# Patient Record
Sex: MALE | Race: WHITE | NOT HISPANIC OR LATINO | Employment: OTHER | URBAN - METROPOLITAN AREA
[De-identification: names, ages, dates, MRNs, and addresses within clinical notes are randomized per-mention and may not be internally consistent; named-entity substitution may affect disease eponyms.]

---

## 2018-07-13 ENCOUNTER — TELEPHONE (OUTPATIENT)
Dept: CARDIOLOGY CLINIC | Facility: CLINIC | Age: 73
End: 2018-07-13

## 2018-07-13 NOTE — TELEPHONE ENCOUNTER
Patient called and stated he is having edema in his legs  He is on hydrochlorothiazide  He wants to know if he should increase it? Or come in for an appt?

## 2018-07-18 NOTE — TELEPHONE ENCOUNTER
Called patient twice to make an appt  Wife answered and said she will have him call back if he wants to make an appt

## 2018-12-24 DIAGNOSIS — I10 BENIGN ESSENTIAL HTN: Primary | ICD-10-CM

## 2018-12-24 RX ORDER — AMLODIPINE BESYLATE 5 MG/1
TABLET ORAL
Qty: 90 TABLET | Refills: 5 | Status: SHIPPED | OUTPATIENT
Start: 2018-12-24 | End: 2018-12-28 | Stop reason: SDUPTHER

## 2018-12-28 DIAGNOSIS — I10 BENIGN ESSENTIAL HTN: ICD-10-CM

## 2018-12-29 RX ORDER — AMLODIPINE BESYLATE 5 MG/1
5 TABLET ORAL DAILY
Qty: 90 TABLET | Refills: 3 | Status: SHIPPED | OUTPATIENT
Start: 2018-12-29 | End: 2019-02-14 | Stop reason: SDUPTHER

## 2019-01-02 ENCOUNTER — OFFICE VISIT (OUTPATIENT)
Dept: URGENT CARE | Facility: CLINIC | Age: 74
End: 2019-01-02
Payer: MEDICARE

## 2019-01-02 VITALS
SYSTOLIC BLOOD PRESSURE: 122 MMHG | WEIGHT: 221 LBS | DIASTOLIC BLOOD PRESSURE: 70 MMHG | RESPIRATION RATE: 20 BRPM | OXYGEN SATURATION: 99 % | BODY MASS INDEX: 31.64 KG/M2 | HEIGHT: 70 IN | HEART RATE: 57 BPM | TEMPERATURE: 98.2 F

## 2019-01-02 DIAGNOSIS — L23.7 ALLERGIC CONTACT DERMATITIS DUE TO PLANTS, EXCEPT FOOD: Primary | ICD-10-CM

## 2019-01-02 PROCEDURE — G0463 HOSPITAL OUTPT CLINIC VISIT: HCPCS | Performed by: NURSE PRACTITIONER

## 2019-01-02 PROCEDURE — 99203 OFFICE O/P NEW LOW 30 MIN: CPT | Performed by: NURSE PRACTITIONER

## 2019-01-02 RX ORDER — HYDROCHLOROTHIAZIDE 12.5 MG/1
12.5 CAPSULE, GELATIN COATED ORAL DAILY
COMMUNITY
End: 2019-02-14 | Stop reason: SDUPTHER

## 2019-01-02 RX ORDER — PREDNISONE 10 MG/1
TABLET ORAL
Qty: 30 TABLET | Refills: 0 | Status: SHIPPED | OUTPATIENT
Start: 2019-01-02 | End: 2021-08-14 | Stop reason: ALTCHOICE

## 2019-01-02 NOTE — PATIENT INSTRUCTIONS
Contact Dermatitis   WHAT YOU NEED TO KNOW:   Contact dermatitis is a skin rash  It develops when you touch something that irritates your skin or causes an allergic reaction  DISCHARGE INSTRUCTIONS:   Call 911 for any of the following:   · You have sudden trouble breathing  · Your throat swells and you have trouble eating  · Your face is swollen  Contact your healthcare provider if:   · You have a fever  · Your blisters are draining pus  · Your rash spreads or does not get better, even after treatment  · You have questions or concerns about your condition or care  Medicines:   · Medicines  help decrease itching and swelling  They will be given as a topical medicine to apply to your rash or as a pill  · Take your medicine as directed  Contact your healthcare provider if you think your medicine is not helping or if you have side effects  Tell him or her if you are allergic to any medicine  Keep a list of the medicines, vitamins, and herbs you take  Include the amounts, and when and why you take them  Bring the list or the pill bottles to follow-up visits  Carry your medicine list with you in case of an emergency  Manage contact dermatitis:   · Take short baths or showers in cool water  Use mild soap or soap-free cleansers  Add oatmeal, baking soda, or cornstarch to the bath water to help decrease skin irritation  · Avoid skin irritants , such as makeup, hair products, soaps, and cleansers  Use products that do not contain perfume or dye  · Apply a cool compress to your rash  This will help soothe your skin  · Keep your skin moist   Rub unscented cream or lotion on your skin to prevent dryness and itching  Do this right after a bath or shower when your skin is still damp  Follow up with your healthcare provider or dermatologist in 2 to 3 days:  Write down your questions so you remember to ask them during your visits     © 2017 [employer] Information is for End User's use only and may not be sold, redistributed or otherwise used for commercial purposes  All illustrations and images included in CareNotes® are the copyrighted property of A D A M , Inc  or Vipin Poe  The above information is an  only  It is not intended as medical advice for individual conditions or treatments  Talk to your doctor, nurse or pharmacist before following any medical regimen to see if it is safe and effective for you

## 2019-01-02 NOTE — PROGRESS NOTES
[facility]        NAME: Mikayla Apodaca is a 72 y o  male  : 1945    MRN: [id number]  DATE: 2019  TIME: 3:11 PM    Assessment and Plan   Allergic contact dermatitis due to plants, except food [L23 7]  1  Allergic contact dermatitis due to plants, except food  predniSONE 10 mg tablet         Patient Instructions     Use Claritin during the day and Benadryl at night as directed  Apply cool compresses to affected areas as needed  Follow up with PCP in 3-5 days  Proceed to  ER if symptoms worsen  Chief Complaint     Chief Complaint   Patient presents with    Rash     rash on right wrist,belly and legs for 4-5 days         History of Present Illness       Rash   This is a recurrent problem  Episode onset: Four days  Had previous rash from exposure to poison ivy 3 weeks ago  It resolved with an 8 day a steroid taper  The affected locations include the chest, left arm, left wrist, right wrist and right arm  Past treatments include antihistamine, anti-itch cream and cold compress  The treatment provided mild relief  Review of Systems   Review of Systems   Constitutional: Negative  HENT: Negative  Eyes: Negative  Respiratory: Negative  Cardiovascular: Negative  Gastrointestinal: Negative  Genitourinary: Negative  Musculoskeletal: Negative  Skin: Positive for rash           Current Medications       Current Outpatient Prescriptions:     amLODIPine (NORVASC) 5 mg tablet, Take 1 tablet (5 mg total) by mouth daily, Disp: 90 tablet, Rfl: 3    hydrochlorothiazide (MICROZIDE) 12 5 mg capsule, Take 12 5 mg by mouth daily, Disp: , Rfl:     predniSONE 10 mg tablet, 5 tabs once daily x 2 days then decrease by 1 tab every other day until finished, Disp: 30 tablet, Rfl: 0    Current Allergies     Allergies as of 2019    (No Known Allergies)            The following portions of the patient's history were reviewed and updated as appropriate: allergies, current medications, past family history, past medical history, past social history, past surgical history and problem list      Past Medical History:   Diagnosis Date    Hypertension        No past surgical history on file  No family history on file  Medications have been verified  Objective   /70   Pulse 57   Temp 98 2 °F (36 8 °C) (Tympanic)   Resp 20   Ht 5' 10" (1 778 m)   Wt 100 kg (221 lb)   SpO2 99%   BMI 31 71 kg/m²        Physical Exam     Physical Exam   Constitutional: He is oriented to person, place, and time  He appears well-developed and well-nourished  No distress  HENT:   Head: Normocephalic and atraumatic  Right Ear: External ear normal    Left Ear: External ear normal    Nose: Nose normal    Mouth/Throat: Oropharynx is clear and moist    Eyes: Pupils are equal, round, and reactive to light  Conjunctivae and EOM are normal    Neck: Normal range of motion  Neck supple  Cardiovascular: Normal rate, regular rhythm and normal heart sounds  Pulmonary/Chest: Effort normal and breath sounds normal  No respiratory distress  He has no wheezes  He has no rales  Abdominal: Soft  Bowel sounds are normal    Neurological: He is alert and oriented to person, place, and time  He has normal reflexes  Skin: Skin is warm and dry  Rash noted  Rash is maculopapular (Both upper extremities and wrists  )  He is not diaphoretic  Psychiatric: He has a normal mood and affect  His behavior is normal  Judgment and thought content normal    Nursing note and vitals reviewed

## 2019-02-14 ENCOUNTER — OFFICE VISIT (OUTPATIENT)
Dept: CARDIOLOGY CLINIC | Facility: CLINIC | Age: 74
End: 2019-02-14
Payer: MEDICARE

## 2019-02-14 VITALS
WEIGHT: 224 LBS | HEIGHT: 70 IN | BODY MASS INDEX: 32.07 KG/M2 | HEART RATE: 50 BPM | DIASTOLIC BLOOD PRESSURE: 86 MMHG | SYSTOLIC BLOOD PRESSURE: 142 MMHG

## 2019-02-14 DIAGNOSIS — I10 BENIGN ESSENTIAL HTN: ICD-10-CM

## 2019-02-14 DIAGNOSIS — E03.9 HYPOTHYROIDISM, UNSPECIFIED TYPE: Primary | ICD-10-CM

## 2019-02-14 PROCEDURE — 99213 OFFICE O/P EST LOW 20 MIN: CPT | Performed by: PHYSICIAN ASSISTANT

## 2019-02-14 RX ORDER — AMLODIPINE BESYLATE 5 MG/1
5 TABLET ORAL DAILY
Qty: 90 TABLET | Refills: 3 | Status: SHIPPED | OUTPATIENT
Start: 2019-02-14

## 2019-02-14 RX ORDER — HYDROCHLOROTHIAZIDE 12.5 MG/1
12.5 CAPSULE, GELATIN COATED ORAL DAILY
Qty: 90 CAPSULE | Refills: 3 | Status: SHIPPED | OUTPATIENT
Start: 2019-02-14 | End: 2019-10-01 | Stop reason: CLARIF

## 2019-02-14 RX ORDER — LEVOTHYROXINE SODIUM 88 UG/1
CAPSULE ORAL DAILY
COMMUNITY

## 2019-02-14 NOTE — PROGRESS NOTES
[facility]     Júnior Sagastume / 72 y o  male / : 1945 / MRN: [id number]  Date of Visit: 19    ASSESSMENT/PLAN    Benign essential HTN  · Well controlled on amlodipine and HCTZ    Hypothyroidism  · On levothyroxine      Patient will f/u with us on a PRN basis  SUBJECTIVE:  HPI: Júnior Sagastume is a 72y o  year-old male who presents for follow-up regarding HTN  He is doing very well and his only complaint is some shoulder pain  He was originally referred to us for BP control  He reports that his current regimen of amlodipine and HCTZ has been effective in the last year and BP at home averages 130s/70s, with occasional SBP in the low 140s  He denies chest pain, dyspnea, edema, orthopnea, lightheadedness, near syncope and syncope  He is bradycardic today on exam, but reports he is asymptomatic  He defers EKG today    Other cardiac testing:    Echo 2018 - Normal LV systolic function, mild LVH     No Known Allergies      Current Outpatient Medications:     amLODIPine (NORVASC) 5 mg tablet, Take 1 tablet (5 mg total) by mouth daily, Disp: 90 tablet, Rfl: 3    hydrochlorothiazide (MICROZIDE) 12 5 mg capsule, Take 1 capsule (12 5 mg total) by mouth daily, Disp: 90 capsule, Rfl: 3    Levothyroxine Sodium 88 MCG CAPS, Take by mouth daily, Disp: , Rfl:     predniSONE 10 mg tablet, 5 tabs once daily x 2 days then decrease by 1 tab every other day until finished (Patient not taking: Reported on 2019), Disp: 30 tablet, Rfl: 0    Past Medical History:   Diagnosis Date    History of echocardiogram 2018    Normal EF, Mild LVH    Hypertension     Hypothyroidism        Family History   Problem Relation Age of Onset    Heart disease Mother          at age 59       History reviewed  No pertinent surgical history      Social History     Socioeconomic History    Marital status: /Civil Union     Spouse name: Not on file    Number of children: Not on file    Years of education: Not on file    Highest education level: Not on file   Occupational History    Not on file   Social Needs    Financial resource strain: Not on file    Food insecurity:     Worry: Not on file     Inability: Not on file    Transportation needs:     Medical: Not on file     Non-medical: Not on file   Tobacco Use    Smoking status: Never Smoker    Smokeless tobacco: Never Used   Substance and Sexual Activity    Alcohol use: Not on file    Drug use: Not on file    Sexual activity: Not on file   Lifestyle    Physical activity:     Days per week: Not on file     Minutes per session: Not on file    Stress: Not on file   Relationships    Social connections:     Talks on phone: Not on file     Gets together: Not on file     Attends Christianity service: Not on file     Active member of club or organization: Not on file     Attends meetings of clubs or organizations: Not on file     Relationship status: Not on file    Intimate partner violence:     Fear of current or ex partner: Not on file     Emotionally abused: Not on file     Physically abused: Not on file     Forced sexual activity: Not on file   Other Topics Concern    Not on file   Social History Narrative    Not on file       Review of Systems   Constitution: Negative  HENT: Negative  Eyes: Negative  Cardiovascular: Negative for chest pain, claudication, dyspnea on exertion, irregular heartbeat, leg swelling, near-syncope, orthopnea, palpitations, paroxysmal nocturnal dyspnea and syncope  Respiratory: Negative for cough, shortness of breath and wheezing  Endocrine: Negative  Skin: Negative  Musculoskeletal: Positive for joint pain (R shoulder)  Gastrointestinal: Negative  Genitourinary: Negative  Neurological: Negative for dizziness and light-headedness  Psychiatric/Behavioral: Negative        OBJECTIVE:  Vitals: /86   Pulse (!) 50   Ht 5' 10" (1 778 m)   Wt 102 kg (224 lb)   BMI 32 14 kg/m²     Physical exam:   Physical Exam   Constitutional: He is oriented to person, place, and time  He appears well-developed and well-nourished  No distress  HENT:   Head: Normocephalic and atraumatic  Eyes: EOM are normal  No scleral icterus  Neck: Normal range of motion  Neck supple  No JVD present  Cardiovascular: Regular rhythm, S1 normal, S2 normal and intact distal pulses  Bradycardia present  No murmur heard  Pulmonary/Chest: Effort normal and breath sounds normal  He has no wheezes  He has no rales  Abdominal: Soft  Bowel sounds are normal    Musculoskeletal: He exhibits no edema  Neurological: He is alert and oriented to person, place, and time  Skin: Skin is warm and dry  Psychiatric: He has a normal mood and affect   His behavior is normal      Lab Results:   No results found for: HGBA1C  No results found for: CHOL  No results found for: HDL  No results found for: LDLCALC  No results found for: TRIG  No results found for: CHOLHDL

## 2019-10-01 DIAGNOSIS — I10 BENIGN HYPERTENSION: Primary | ICD-10-CM

## 2019-10-01 RX ORDER — HYDROCHLOROTHIAZIDE 12.5 MG/1
12.5 TABLET ORAL DAILY
Qty: 90 TABLET | Refills: 3 | Status: SHIPPED | OUTPATIENT
Start: 2019-10-01 | End: 2020-12-11

## 2020-02-04 ENCOUNTER — EVALUATION (OUTPATIENT)
Dept: PHYSICAL THERAPY | Facility: CLINIC | Age: 75
End: 2020-02-04
Payer: MEDICARE

## 2020-02-04 DIAGNOSIS — G20 PARKINSON'S DISEASE (HCC): Primary | ICD-10-CM

## 2020-02-04 PROCEDURE — 97110 THERAPEUTIC EXERCISES: CPT | Performed by: PHYSICAL THERAPIST

## 2020-02-04 PROCEDURE — 97163 PT EVAL HIGH COMPLEX 45 MIN: CPT | Performed by: PHYSICAL THERAPIST

## 2020-02-04 PROCEDURE — 97112 NEUROMUSCULAR REEDUCATION: CPT | Performed by: PHYSICAL THERAPIST

## 2020-02-04 NOTE — LETTER
2020    Jm Monson DO  [address] [facility]  [address] [facility] [address]    Patient: Rayna Henson   YOB: 1945   Date of Visit: 2020     Encounter Diagnosis     ICD-10-CM    1  Parkinson's disease [facility]) G20        Dear Dr Dolores Li: Thank you for your recent referral of Rayna Henson  Please review the attached evaluation summary from Al's recent visit  Please verify that you agree with the plan of care by signing the attached order  If you have any questions or concerns, please do not hesitate to call  I sincerely appreciate the opportunity to share in the care of one of your patients and hope to have another opportunity to work with you in the near future  Sincerely,    Elenita Jackson      Referring Provider:      I certify that I have read the below Plan of Care and certify the need for these services furnished under this plan of treatment while under my care  Jm Monson, [address] [address]  VIA Facsimile: [phone]          PT Evaluation     Today's date: 2020  Patient name: Rayna Henson  : 1945  MRN: [id number]  Referring provider: Clark Potts DO  Dx:   Encounter Diagnosis     ICD-10-CM    1  Parkinson's disease ([facility] [address] ) G20                   Assessment  Assessment details: Patient is a 69 y/o male who presents to OPPT with the diagnosis of Parkinson's Disease   Patient demonstrates deficits in ROM, strength, balance, motor planning, divided attention tasks, and functional activities   Patient demonstrates co-morbidities of cataract surgery with artificial lens, h/o GHJ pain/strength, recent history of falls that may impact progress with OPPT interventions   He will benefit from skilled PT interventions at 3 times per week for 4 weeks in order to maximize progress toward personal  goals as established during the first week of therapy interventions      Impairments: abnormal coordination, abnormal gait, abnormal or restricted ROM, activity intolerance, impaired balance, impaired physical strength, lacks appropriate home exercise program, safety issue and poor posture   Understanding of Dx/Px/POC: good   Prognosis: good    Goals  In 2 weeks, patient will:  1        Demonstrate ability to don (UB item of clothing)  in < ** seconds without assistance  2  Demonstrate ability to don (LB item of clothing) in < ** seconds  3  Demonstrate ability to ambulate through doorway/threshold without verbal cues or change in gait quality >80% of occasions  4  Demonstrate ability to reach waist to shoulder/above head without posterior LOB    In 4 weeks, patient will:  5  Demonstrate ability to ambulate forward holding item requiring bilateral UEs without LOB or change in gait quality  6  Ascend stairs step over step with single UE support without change in movement quality  7  Demonstrate ability to roll bilaterally in <5 seconds without use of assistive equipment  8  Demonstrate reduced fall risk based on functional outcome measure      Plan  Patient would benefit from: skilled physical therapy  Other planned modality interventions: Modalities prn for symptom management  Planned therapy interventions: manual therapy, neuromuscular re-education, therapeutic exercise and home exercise program  Frequency: 3x week  Duration in visits: 12  Duration in weeks: 4  Plan of Care beginning date: 2/4/2020  Plan of Care expiration date: 3/5/2020  Treatment plan discussed with: patient        Subjective Evaluation    History of Present Illness  Mechanism of injury: Patient reports he has had symptoms of PD for years  Notes that several years ago he had a cataract surgery requiring an artificial lens to be placed  Since that time he has a 4 inch difference between his eyes  He contributes a lot of his balance deficits to the change in his vision    Denies numbness/tingling/pain on a regular basis  Has had cortisone shots that seem to relieve the orthopedic pain  Primary complaints:  Legs started to get weaker in the last 3-4 months  Fatigues quicker than he would like  Entering/exiting bed  Walking  Negotiation of stairs  Clearing obstacles  Does not climb on ladders at this point  Difficulty with getting up/down from the floor  Things are just taking longer to do  Quality of life: good    Pain  No pain reported  Aggravating factors: stair climbing  Progression: no change    Social Support  Steps to enter house: yes  Stairs in house: yes   Lives in: multiple-level home  Lives with: spouse    Employment status: not working (Retired, but very active)  Hand dominance: right      Diagnostic Tests  Abnormal MRI: IMPRESSION: No intracranial mass or abnormal enhancement  Moderate chronic microvascular  Treatments  Previous treatment: physical therapy ([facility])  Patient Goals  Patient goals for therapy: improved balance, independence with ADLs/IADLs and increased strength          Objective     Concurrent Complaints  Positive for visual change (h/o eye surgery/cataract, artificial lens  )  Negative for headaches, nausea/motion sickness, tinnitus, hearing loss, aural fullness, poor concentration and peripheral neuropathy    Strength/Myotome Testing     Left Hip   Planes of Motion   Flexion: 4-  Extension: 3  Abduction: 3+    Right Hip   Planes of Motion   Flexion: 4  Extension: 3+  Abduction: 4    Left Knee   Flexion: 4-  Extension: 4    Right Knee   Flexion: 4+  Extension: 4+    Left Ankle/Foot   Dorsiflexion: 5  Plantar flexion: 5    Right Ankle/Foot   Dorsiflexion: 5  Plantar flexion: 5  Neuro Exam:     Dizziness  Negative for disequilibrium, oscillopsia and diplopia  Headaches   Patient reports headaches: No      Cervical exam   Ligament Laxity Testing   Alar ligament: WNL  Sharp Karin:  WNL  Modified VBI   Left: asymptomatic  Right: asymptomatic    Oculomotor exam   Oculomotor ROM: diminished  Resting nystagmus: not present   Gaze holding nystagmus: not present left  and not present right  Smooth pursuits: saccadic smooth pursuit    Sensation   Light touch LE: left WNL and right WNL  Proprioception LE: left WNL and right WNL    Coordination   Heel to shin: left WNL and right WNL  Rapid alternating movements: LE impaired     Functional outcomes   Functional outcome comment: Mini Best     Sit to Stand  1=Moderate: Comes to stand WITH use of hands on first attempt     Rise to Toes  2= Normal:  Stable for 3 seconds with maximum height     Stand on one leg  0=Severe:  Unable  Right:  2 seconds  Left:  2 seconds      Compensatory Stepping Correction - Forward  2= Normal:  Recovers independently with a single, large step (2nd realignment step is allowed)  Compensatory Stepping Correction - Backward  2= Normal:  Recovers independently with a single, large step (2nd realignment step is allowed)  Compensatory Stepping Correction - Lateral  2= Normal:  Recovers independently with a single, large step (cross over or lateral OK)  Stance (Feet Together); Eyes open, Firm surface  2= Normal:  30 seconds     Stance (Feet Together);  Eyes closed, Foam surface  2= Normal:  30 seconds    Incline - Eyes Closed  2= Normal:  Stands independently 30 seconds and aligns with gravity    Change in gait speed  1= Moderate:  Unable to change walking speed or signs of imbalance     Walk with Head Turns - Horizontal  1= Moderate:  Performs head turns with reduction in gait speed     Walk with Pivot Turns  1= Moderate:  Turns with feet close SLOW (> or = to 4 steps) with good balance     Step over obstacles  2= Normal:  Able to step over box with minimal change of gait speed and with good balance     Timed up and go with dual task (3 meter walk)  TU 8 Seconds  Dual Task TU 2 Seconds  2= Normal:  No noticeable change in sitting, standing, or walking while backward counting when compared to TUG without dual task    Total Score: 22/28    Functional outcome gait comment: Ambulates with decreased awareness of environmental challenges, cues to avoid obstacles intermittently  Patient attributes this to his visual challenges since surgery  Good foot clearance on level surfaces, increased attention to task required for negotiation of stairs               Precautions: Falls  Re-eval Date: 3/3/2020    Date 2/4/2020       Visit Count 1       FOTO See IE       Pain In See IE       Pain Out See IE               Manual         LE stretching                    Exercise Diary  180/90, HR 73, SpO2 98% with activity       Aerobic SRB 10 mins L2               PWR focus                Gait with turns                Gait with divided attention                Gait with bimanual manipulation                Floor transfers                Obstacle course                            Modalities

## 2020-02-04 NOTE — PROGRESS NOTES
PT Evaluation     Today's date: 2020  Patient name: Tristian Lomas  : 1945  MRN: [id number]  Referring provider: Addis Green DO  Dx:   Encounter Diagnosis     ICD-10-CM    1  Parkinson's disease ([facility] [address] ) G20                   Assessment  Assessment details: Patient is a 67 y/o male who presents to OPPT with the diagnosis of Parkinson's Disease   Patient demonstrates deficits in ROM, strength, balance, motor planning, divided attention tasks, and functional activities   Patient demonstrates co-morbidities of cataract surgery with artificial lens, h/o GHJ pain/strength, recent history of falls that may impact progress with OPPT interventions   He will benefit from skilled PT interventions at 3 times per week for 4 weeks in order to maximize progress toward personal  goals as established during the first week of therapy interventions  Impairments: abnormal coordination, abnormal gait, abnormal or restricted ROM, activity intolerance, impaired balance, impaired physical strength, lacks appropriate home exercise program, safety issue and poor posture   Understanding of Dx/Px/POC: good   Prognosis: good    Goals  In 2 weeks, patient will:  1        Demonstrate ability to don (UB item of clothing)  in < ** seconds without assistance  2  Demonstrate ability to don (LB item of clothing) in < ** seconds  3  Demonstrate ability to ambulate through doorway/threshold without verbal cues or change in gait quality >80% of occasions  4  Demonstrate ability to reach waist to shoulder/above head without posterior LOB    In 4 weeks, patient will:  5  Demonstrate ability to ambulate forward holding item requiring bilateral UEs without LOB or change in gait quality  6  Ascend stairs step over step with single UE support without change in movement quality  7  Demonstrate ability to roll bilaterally in <5 seconds without use of assistive equipment  8        Demonstrate reduced fall risk based on functional outcome measure      Plan  Patient would benefit from: skilled physical therapy  Other planned modality interventions: Modalities prn for symptom management  Planned therapy interventions: manual therapy, neuromuscular re-education, therapeutic exercise and home exercise program  Frequency: 3x week  Duration in visits: 12  Duration in weeks: 4  Plan of Care beginning date: 2/4/2020  Plan of Care expiration date: 3/5/2020  Treatment plan discussed with: patient        Subjective Evaluation    History of Present Illness  Mechanism of injury: Patient reports he has had symptoms of PD for years  Notes that several years ago he had a cataract surgery requiring an artificial lens to be placed  Since that time he has a 4 inch difference between his eyes  He contributes a lot of his balance deficits to the change in his vision  Denies numbness/tingling/pain on a regular basis  Has had cortisone shots that seem to relieve the orthopedic pain  Primary complaints:  Legs started to get weaker in the last 3-4 months  Fatigues quicker than he would like  Entering/exiting bed  Walking  Negotiation of stairs  Clearing obstacles  Does not climb on ladders at this point  Difficulty with getting up/down from the floor  Things are just taking longer to do  Quality of life: good    Pain  No pain reported  Aggravating factors: stair climbing  Progression: no change    Social Support  Steps to enter house: yes  Stairs in house: yes   Lives in: multiple-level home  Lives with: spouse    Employment status: not working (Retired, but very active)  Hand dominance: right      Diagnostic Tests  Abnormal MRI: IMPRESSION: No intracranial mass or abnormal enhancement  Moderate chronic microvascular    Treatments  Previous treatment: physical therapy ([facility])  Patient Goals  Patient goals for therapy: improved balance, independence with ADLs/IADLs and increased strength          Objective     Concurrent Complaints  Positive for visual change (h/o eye surgery/cataract, artificial lens  )  Negative for headaches, nausea/motion sickness, tinnitus, hearing loss, aural fullness, poor concentration and peripheral neuropathy    Strength/Myotome Testing     Left Hip   Planes of Motion   Flexion: 4-  Extension: 3  Abduction: 3+    Right Hip   Planes of Motion   Flexion: 4  Extension: 3+  Abduction: 4    Left Knee   Flexion: 4-  Extension: 4    Right Knee   Flexion: 4+  Extension: 4+    Left Ankle/Foot   Dorsiflexion: 5  Plantar flexion: 5    Right Ankle/Foot   Dorsiflexion: 5  Plantar flexion: 5  Neuro Exam:     Dizziness  Negative for disequilibrium, oscillopsia and diplopia  Headaches   Patient reports headaches: No      Cervical exam   Ligament Laxity Testing   Alar ligament: WNL  Sharp Karin: WNL  Modified VBI   Left: asymptomatic  Right: asymptomatic    Oculomotor exam   Oculomotor ROM: diminished  Resting nystagmus: not present   Gaze holding nystagmus: not present left  and not present right  Smooth pursuits: saccadic smooth pursuit    Sensation   Light touch LE: left WNL and right WNL  Proprioception LE: left WNL and right WNL    Coordination   Heel to shin: left WNL and right WNL  Rapid alternating movements: LE impaired     Functional outcomes   Functional outcome comment: Mini Best     Sit to Stand  1=Moderate: Comes to stand WITH use of hands on first attempt     Rise to Toes  2= Normal:  Stable for 3 seconds with maximum height     Stand on one leg  0=Severe:  Unable  Right:  2 seconds  Left:  2 seconds      Compensatory Stepping Correction - Forward  2= Normal:  Recovers independently with a single, large step (2nd realignment step is allowed)  Compensatory Stepping Correction - Backward  2= Normal:  Recovers independently with a single, large step (2nd realignment step is allowed)       Compensatory Stepping Correction - Lateral  2= Normal:  Recovers independently with a single, large step (cross over or lateral OK)     Stance (Feet Together); Eyes open, Firm surface  2= Normal:  30 seconds     Stance (Feet Together); Eyes closed, Foam surface  2= Normal:  30 seconds    Incline - Eyes Closed  2= Normal:  Stands independently 30 seconds and aligns with gravity    Change in gait speed  1= Moderate:  Unable to change walking speed or signs of imbalance     Walk with Head Turns - Horizontal  1= Moderate:  Performs head turns with reduction in gait speed     Walk with Pivot Turns  1= Moderate:  Turns with feet close SLOW (> or = to 4 steps) with good balance     Step over obstacles  2= Normal:  Able to step over box with minimal change of gait speed and with good balance     Timed up and go with dual task (3 meter walk)  TU 8 Seconds  Dual Task TU 2 Seconds  2= Normal:  No noticeable change in sitting, standing, or walking while backward counting when compared to TUG without dual task    Total Score: 22/28    Functional outcome gait comment: Ambulates with decreased awareness of environmental challenges, cues to avoid obstacles intermittently  Patient attributes this to his visual challenges since surgery  Good foot clearance on level surfaces, increased attention to task required for negotiation of stairs               Precautions: Falls  Re-eval Date: 3/3/2020    Date 2020       Visit Count 1       FOTO See IE       Pain In See IE       Pain Out See IE               Manual         LE stretching                    Exercise Diary  180/90, HR 73, SpO2 98% with activity       Aerobic SRB 10 mins L2               PWR focus                Gait with turns                Gait with divided attention                Gait with bimanual manipulation                Floor transfers                Obstacle course                            Modalities

## 2020-02-07 ENCOUNTER — TRANSCRIBE ORDERS (OUTPATIENT)
Dept: PHYSICAL THERAPY | Facility: CLINIC | Age: 75
End: 2020-02-07

## 2020-02-07 DIAGNOSIS — G20 PARKINSON DISEASE (HCC): Primary | ICD-10-CM

## 2020-02-10 ENCOUNTER — OFFICE VISIT (OUTPATIENT)
Dept: PHYSICAL THERAPY | Facility: CLINIC | Age: 75
End: 2020-02-10
Payer: MEDICARE

## 2020-02-10 DIAGNOSIS — G20 PARKINSON'S DISEASE (HCC): Primary | ICD-10-CM

## 2020-02-10 PROCEDURE — 97110 THERAPEUTIC EXERCISES: CPT

## 2020-02-10 PROCEDURE — 97112 NEUROMUSCULAR REEDUCATION: CPT

## 2020-02-10 NOTE — PROGRESS NOTES
Daily Note     Today's date: 2/10/2020  Patient name: Sharath Toth  : 1945  MRN: [id number]  Referring provider: Noé Fleming DO  Dx:   Encounter Diagnosis     ICD-10-CM    1  Parkinson's disease ([facility] [address] ) G20                   Subjective:  I see neurologist I think the  this month  I fell 2x over the weekend and once this morning      Objective: See treatment diary below      Assessment: Tolerated treatment well  Denied any increase pain/sx's with initial PWR exercises  Patient provided verbal/tactile cues prn for proper form and technique with exercises completed this date  Pt/spouse educated / issued HEP with encouragement compliancy  Review upcoming / [place]    NO LOB this date, provided CS with ambulation 2* FALL RISK   Patient would benefit from continued PT improve BL LE strength/stability/mobility, gait quality/safety and endurance to max overall QOL          Plan: Continue per plan of care        Precautions: Fall RISK  Re-eval Date: 3/3/2020    Date 2020 2/10      Visit Count 1 2      FOTO See IE/completed       Pain In See IE       Pain Out See IE         Manual         LE stretching                  Exercise Diary  180/90, HR 73, SpO2 98% with activity /84 before NS  134/82 end rx session  SPO2 98%  HR 72      Aerobic SRB 10 mins L2 Nustep  L3 10 min  Monitor vitals  Cues SPM   L4             PWR focus  Sit/Stand  40 min  Cues   - Up  - Rock  - Twist  - Step Floor               Gait with turns  10 min  -Turns  - Fast/slow/  stop  - Head turns  CS/CGA              Gait with divided attention   *             Gait with bimanual manipulation                Floor transfers                Obstacle course                            Modalities

## 2020-02-12 ENCOUNTER — OFFICE VISIT (OUTPATIENT)
Dept: PHYSICAL THERAPY | Facility: CLINIC | Age: 75
End: 2020-02-12
Payer: MEDICARE

## 2020-02-12 DIAGNOSIS — G20 PARKINSON'S DISEASE (HCC): Primary | ICD-10-CM

## 2020-02-12 PROCEDURE — 97110 THERAPEUTIC EXERCISES: CPT

## 2020-02-12 PROCEDURE — 97112 NEUROMUSCULAR REEDUCATION: CPT | Performed by: PHYSICAL THERAPIST

## 2020-02-12 NOTE — PROGRESS NOTES
Daily Note     Today's date: 2020  Patient name: Niharika Lindo  : 1945  MRN: [id number]  Referring provider: Tony Marrero DO  Dx:   Encounter Diagnosis     ICD-10-CM    1  Parkinson's disease ([facility] [address] ) G20                   Subjective: Patient reports he couldn't do his exercises because the lady on the video kept talking while he had his arms up  Objective: See treatment diary below      Assessment: Tolerated treatment fair  Patient demonstrated fatigue post treatment and would benefit from continued PT  Continued to monitor vitals with no issues noted this date  Ongoing struggles with turns and divided attention, decreased cervical spine movement with turns and environmental scanning  Plan: Continue per plan of care        Precautions: Fall RISK  Re-eval Date: 3/3/2020    Date 2020 2/10 2/12     Visit Count 1 2 3     FOTO See IE/completed       Pain In See IE       Pain Out See IE         Manual         LE stretching                  Exercise Diary  180/90, HR 73, SpO2 98% with activity /84 before NS  134/82 end rx session  SPO2 98%  HR 72 Co-Tx w/PTA/CV 15 min     Aerobic SRB 10 mins L2 Nustep  L3 10 min  Monitor vitals  Cues SPM Nustep  L4 15 min  Monitor vitals  Cues pacing  80-90 SPM             PWR focus  Sit/Stand  40 min  Cues   - Up  - Rock  - Twist  - Step Floor  40 min  Cues and demo 1:1 THIS DATE  - Up  - Rock  - Twist  - Step             Gait with turns  10 min  -Turns  - Fast/slow/  stop  - Head turns  CS/CGA 10 min  -Turns  - Fast/slow/  stop  - Head turns  CS/CGA             Gait with divided attention    **    Gait with head turns    **    Gait with bimanual manipulation                Floor transfers   Supervision to independent without AD/AE and supervision with LE fatigue             Obstacle course                            Modalities

## 2020-02-13 ENCOUNTER — OFFICE VISIT (OUTPATIENT)
Dept: PHYSICAL THERAPY | Facility: CLINIC | Age: 75
End: 2020-02-13
Payer: MEDICARE

## 2020-02-13 DIAGNOSIS — G20 PARKINSON'S DISEASE (HCC): Primary | ICD-10-CM

## 2020-02-13 PROCEDURE — 97112 NEUROMUSCULAR REEDUCATION: CPT

## 2020-02-13 PROCEDURE — 97110 THERAPEUTIC EXERCISES: CPT

## 2020-02-13 PROCEDURE — 97116 GAIT TRAINING THERAPY: CPT

## 2020-02-13 NOTE — PROGRESS NOTES
Daily Note     Today's date: 2020  Patient name: Monie Hairston  : 1945  MRN: [id number]  Referring provider: Hattie Schaumann, DO  Dx:   Encounter Diagnosis     ICD-10-CM    1  Parkinson's disease ([facility] [address] ) G20                   Subjective: I went dancing last night  Some LOB but didn't fall  Have trouble with the pivots with country line dancing      Objective: See treatment diary below      Assessment: Tolerated treatment well  Denied any increase pain/sx's  Pt with 2 LOB during neuro activity 1x self correction / 1x CGA for balance recovery  Pt demonstrates occasional impulsiveness 1* with ambulation/divided attention  Pt demonstrated improvement with turns with functional activities  Patient would benefit from continued PT      Plan: Continue per plan of care        Precautions: Fall RISK  Re-eval Date: 3/3/2020    Date 2020 2/10 2/12 2/13    Visit Count 1 2 3 4    FOTO See IE/completed       Pain In See IE       Pain Out See IE         Manual         LE stretching          Exercise Diary    Co-Tx w/PTA/CV 15 min     VITALS 180/90, HR 73, SpO2 98% with activity /84 before NS  134/82 end rx session  SPO2 98%  HR 72  /82    Aerobic SRB 10 mins L2 Nustep  L3 10 min  Monitor vitals  Cues SPM Nustep  L4 15 min  Monitor vitals  Cues pacing  80-90 SPM Nustep  L5 15 min  Monitor vitals  Cues pacing  80-90 SPM            PWR focus  Sit/Stand  40 min  Cues   - Up  - Rock  - Twist  - Step Floor  40 min  Cues and demo 1:1 THIS DATE  - Up  - Rock  - Twist  - Step Stand  15 min  Cues   - Up  - Rock  - Twist  - Step            Gait with turns  10 min  -Turns  - Fast/slow/  stop  - Head turns  CS/CGA 10 min  -Turns  - Fast/slow/  stop  - Head turns  CS/CGA 15 min w/ functional activities/laundry, transition from firm/foam surface            Gait with divided attention    Gait fwd, side step, high marches w/ divided attention, head turns  15 min  LOB CGA x1    Gait with head turns    **    Gait with bimanual manipulation                Floor transfers   Supervision to independent without AD/AE and supervision with LE fatigue             Obstacle course                            Modalities

## 2020-02-17 ENCOUNTER — OFFICE VISIT (OUTPATIENT)
Dept: PHYSICAL THERAPY | Facility: CLINIC | Age: 75
End: 2020-02-17
Payer: MEDICARE

## 2020-02-17 DIAGNOSIS — G20 PARKINSON'S DISEASE (HCC): Primary | ICD-10-CM

## 2020-02-17 PROCEDURE — 97112 NEUROMUSCULAR REEDUCATION: CPT

## 2020-02-17 PROCEDURE — 97110 THERAPEUTIC EXERCISES: CPT

## 2020-02-17 NOTE — PROGRESS NOTES
Daily Note     Today's date: 2020  Patient name: Kevin Williamson  : 1945  MRN: [id number]  Referring provider: Yadiel Hunt DO  Dx:   Encounter Diagnosis     ICD-10-CM    1  Parkinson's disease ([facility] [address] ) G20                   Subjective: I tried doing some of my exercises    Objective: See treatment diary below      Assessment: Tolerated treatment well  Demonstrated 2 LOB with self correct to CGA for balance recovery  Pt demonstrated difficulty with memory recall with retro ambulation  Pt challenge alt tandem step on/off foam activity  Patient provided verbal/tactile cues prn for proper form and technique with exercises completed this date  Patient would benefit from continued PT      Plan: Continue per plan of care        Precautions: Fall RISK  Re-eval Date: 3/3/2020    Date 2020 2/10 2/12 2/13 2/17   Visit Count 1 2 3 4 5   FOTO See IE/completed       Pain In See IE       Pain Out See IE         Manual         LE stretching          Exercise Diary    Co-Tx w/PTA/CV 15 min     VITALS 180/90, HR 73, SpO2 98% with activity /84 before NS  134/82 end rx session  SPO2 98%  HR 72  /82 /80   Aerobic SRB 10 mins L2 Nustep  L3 10 min  Monitor vitals  Cues SPM Nustep  L4 15 min  Monitor vitals  Cues pacing  80-90 SPM Nustep  L5 15 min  Monitor vitals  Cues pacing  80-90 SPM Nustep  L5 15 min  Monitor vitals  Cues pacing  80-90 SPM           PWR focus  Sit/Stand  40 min  Cues   - Up  - Rock  - Twist  - Step Floor  40 min  Cues and demo 1:1 THIS DATE  - Up  - Rock  - Twist  - Step Stand  15 min  Cues   - Up  - Rock  - Twist  - Step Stand/ foam  15 min  Cues   - Up  - Rock  - Twist  - Step    All 4's  - Twist only           Gait with turns  10 min  -Turns  - Fast/slow/  stop  - Head turns  CS/CGA 10 min  -Turns  - Fast/slow/  stop  - Head turns  CS/CGA 15 min w/ functional activities/laundry, transition from firm/foam surface  resume           Gait with divided attention    Gait fwd, side step, high marches w/ divided attention, head turns  15 min  LOB CGA x1 Gait fwd, side step, high marches w/ divided attention, head turns  15 min  LOB CGA x1   Gait with head turns    ** Hallway  5 min   Gait with bimanual manipulation                Tandem stance w/ ball toss     W/ divided attention  Alt tandem stance every min as angelita  5 min   Floor transfers   Supervision to independent without AD/AE and supervision with LE fatigue             Obstacle course                            Modalities

## 2020-02-20 ENCOUNTER — TRANSCRIBE ORDERS (OUTPATIENT)
Dept: ADMINISTRATIVE | Facility: HOSPITAL | Age: 75
End: 2020-02-20

## 2020-02-20 ENCOUNTER — OFFICE VISIT (OUTPATIENT)
Dept: PHYSICAL THERAPY | Facility: CLINIC | Age: 75
End: 2020-02-20
Payer: MEDICARE

## 2020-02-20 DIAGNOSIS — G23.1 PSP (PROGRESSIVE SUPRANUCLEAR PALSY) (HCC): Primary | ICD-10-CM

## 2020-02-20 DIAGNOSIS — G72.9 MYOPATHY: Primary | ICD-10-CM

## 2020-02-20 DIAGNOSIS — G20 PARKINSON'S DISEASE (HCC): Primary | ICD-10-CM

## 2020-02-20 PROCEDURE — 97112 NEUROMUSCULAR REEDUCATION: CPT

## 2020-02-20 PROCEDURE — 97110 THERAPEUTIC EXERCISES: CPT

## 2020-02-20 NOTE — PROGRESS NOTES
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  It is affecting his left eye where he has trouble keeping it open  MD is scheduling him for an EMG upcoming      Objective: See treatment diary below      Assessment: Tolerated treatment well  Denied any increase pain / sx's  Pt demonstrates delay with L LE with metronome stepping in place  Pt challenge with multi direction stepping  2 LOB today with standing neuro activities w/ CS and pt self correct  Pt /spouse educated stepping strategies in place/multi direction to metronome pacing   Patient would benefit from continued PT      Plan: Continue per plan of care        Precautions: Fall RISK  Re-eval Date: 3/3/2020    Date 2/20 2/10 2/12 2/13 2/17   Visit Count 6 2 3 4 5   FOTO        Pain In 0       Pain Out 0         Manual         LE stretching          Exercise Diary         VITALS /78       Aerobic Nustep  L5 15 min  Monitor vitals  Cues pacing  80-90 SPM               PWR focus Stand firm/foam  15 min  Cues   - Up  - Rock  - Twist  - Step    All 4's  - Up  - Twist w/ divided attention               Gait with turns  resume               Gait with divided attention Gait fwd, side step, high marches w/ divided attention, head turns    resume       Gait with head turns resume       Gait with bimanual manipulation                Tandem stance w/ ball toss W/ divided attention  Alt tandem stance every min as angelita  5 min       Floor transfers                Obstacle course        Fwd Step Dynamic reach  2x 10       Step back and bow Dynamic reach  2x10       Multi directional stepping 5 trials marching in place to 60 BPM,    4 trials ea multi direction - firm  Pt w/ self verbal commands and non verbal commands    4x to 60 BPM  2x to music           Modalities

## 2020-02-21 ENCOUNTER — OFFICE VISIT (OUTPATIENT)
Dept: PHYSICAL THERAPY | Facility: CLINIC | Age: 75
End: 2020-02-21
Payer: MEDICARE

## 2020-02-21 DIAGNOSIS — G20 PARKINSON'S DISEASE (HCC): Primary | ICD-10-CM

## 2020-02-21 PROCEDURE — 97110 THERAPEUTIC EXERCISES: CPT

## 2020-02-21 PROCEDURE — 97112 NEUROMUSCULAR REEDUCATION: CPT

## 2020-02-21 NOTE — PROGRESS NOTES
Daily Note     Today's date: 2020  Patient name: Carlo Hightower  : 1945  MRN: [id number]  Referring provider: Sol Lyman DO  Dx:   Encounter Diagnosis     ICD-10-CM    1  Parkinson's disease ([facility] [address] ) G20                   Subjective: I am doing good today        Objective: See treatment diary below      Assessment: Tolerated treatment well  Denied any increase pain/sx's  Pt demonstrates difficulty with reciprocol UE/LE pattern with gait  Added poles for manual cues  Pt struggled with multidirectional stepping 1* LLE advancement  Patient would benefit from continued PT  Review PWR upcoming      Plan: Continue per plan of care        Precautions: Fall RISK  Re-eval Date: 3/3/2020    Date       Visit Count 6 7      FOTO  completed      Pain In 0 0      Pain Out 0 0        Manual         LE stretching          Exercise Diary         VITALS /78 /78      Aerobic Nustep  L5 15 min  Monitor vitals  Cues pacing  80-90 SPM Upright cycle  L5 15 min  Monitor vitals  Cues for pacing  >80 RPM              PWR focus Stand firm/foam  15 min  Cues   - Up  - Rock  - Twist  - Step    All 4's  - Up  - Twist w/ divided attention review              Gait with turns  resume 5 min  10 min with poles  Inside/outside  Mod cues              Gait with divided attention Gait fwd, side step, high marches w/ divided attention, head turns    resume       Gait with head turns resume       Gait with bimanual manipulation                Tandem stance w/ ball toss W/ divided attention  Alt tandem stance every min as angelita  5 min       Floor transfers                Obstacle course        Fwd Step Dynamic reach  2x 10 resume      Step back and bow Dynamic reach  2x10 resume      Multi directional stepping 5 trials marching in place to 60 BPM,    4 trials ea multi direction - firm  Pt w/ self verbal commands and non verbal commands    4x to 60 BPM  2x to music 5 trials marching in place to 60 BPM,    4 trials ea multi direction - firm  Pt w/ self verbal commands and non verbal commands    4x to 60 BPM  2x to music          Modalities

## 2020-02-25 ENCOUNTER — OFFICE VISIT (OUTPATIENT)
Dept: PHYSICAL THERAPY | Facility: CLINIC | Age: 75
End: 2020-02-25
Payer: MEDICARE

## 2020-02-25 DIAGNOSIS — G20 PARKINSON'S DISEASE (HCC): Primary | ICD-10-CM

## 2020-02-25 PROCEDURE — 97112 NEUROMUSCULAR REEDUCATION: CPT

## 2020-02-25 PROCEDURE — 97110 THERAPEUTIC EXERCISES: CPT

## 2020-02-25 NOTE — PROGRESS NOTES
Daily Note     Today's date: 2020  Patient name: Bhupinder Hodge  : 1945  MRN: [id number]  Referring provider: Jerry Lawrence DO  Dx:   Encounter Diagnosis     ICD-10-CM    1  Parkinson's disease ([facility] [address] ) G20                   Subjective: I got off balance working on my [address], didn't fall to ground but hit may back against equipment  Didn't hurt myself      Objective: See treatment diary below      Assessment: Tolerated treatment fairly well  Pt demonstrates difficulty with divided attention tasks, unable to maintain eye boosts with PWR activities  Challenged with multidirectional stepping with moving to incorrect direction and unable to maintain within specified tempo  Consistent with identifying R vs L  appropriately with multidirectional step activity  Patient would benefit from continued PT      Plan: Continue per plan of care        Precautions: Fall RISK  Re-eval Date: 3/3/2020    Date      Visit Count 6 7 8     FOTO  completed      Pain In 0 0      Pain Out 0 0        Manual         LE stretching          Exercise Diary         VITALS /78 /78 /76     Aerobic Nustep  L5 15 min  Monitor vitals  Cues pacing  80-90 SPM Upright cycle  L5 15 min  Monitor vitals  Cues for pacing  >80 RPM Nustep  L5 15 min  Monitor vitials             PWR focus Stand firm/foam  15 min  Cues   - Up  - Rock  - Twist  - Step    All 4's  - Up  - Twist w/ divided attention review All 4's, standing/firm  30 min  With EYE boosts, divided attention, function reaching    cues             Gait with turns  resume 5 min  10 min with poles  Inside/outside  Mod cues              Gait with divided attention Gait fwd, side step, high marches w/ divided attention, head turns    resume       Gait with head turns resume       Gait with bimanual manipulation                Tandem stance w/ ball toss W/ divided attention  Alt tandem stance every min as angelita  5 min       Floor transfers                Obstacle course        Fwd Step Dynamic reach  2x 10 resume      Step back and bow Dynamic reach  2x10 resume      Multi directional stepping 5 trials marching in place to 60 BPM,    4 trials ea multi direction - firm  Pt w/ self verbal commands and non verbal commands    4x to 60 BPM  2x to music 5 trials marching in place to 60 BPM,    4 trials ea multi direction - firm  Pt w/ self verbal commands and non verbal commands    4x to 60 BPM  2x to music 15 min  55-60 BPM    Level/red foam  Cues  Step stride length    CS         Modalities

## 2020-02-27 ENCOUNTER — OFFICE VISIT (OUTPATIENT)
Dept: PHYSICAL THERAPY | Facility: CLINIC | Age: 75
End: 2020-02-27
Payer: MEDICARE

## 2020-02-27 DIAGNOSIS — G20 PARKINSON'S DISEASE (HCC): Primary | ICD-10-CM

## 2020-02-27 PROCEDURE — 97112 NEUROMUSCULAR REEDUCATION: CPT

## 2020-02-27 PROCEDURE — 97110 THERAPEUTIC EXERCISES: CPT

## 2020-02-27 NOTE — PROGRESS NOTES
Daily Note     Today's date: 2020  Patient name: Kusum White  : 1945  MRN: [id number]  Referring provider: Jessie Watts DO  Dx:   Encounter Diagnosis     ICD-10-CM    1  Parkinson's disease ([facility] [address] ) G20                   Subjective: I didn't do well with country line dancing last night difficulty with pivots  But I didn't stumble or fall, just lost my balance      Objective: See treatment diary below      Assessment: Tolerated treatment well  Denied any increase pain / sx's  Pt demonstrated difficulty with eye boosts today   Added hand boost to PWR activities  Pt with decrease radames with divided attention   Patient would benefit from continued PT to improve BL LE strength/stability/mobility to maximize safety with functional / recreational activities     Continue per plan of care        Precautions: Fall RISK  Re-eval Date: 3/3/2020    Date     Visit Count 6 7 8 9    FOTO  completed      Pain In 0 0      Pain Out 0 0        Manual         LE stretching          Exercise Diary         VITALS /78 /78 /76 140/82    Aerobic Nustep  L5 15 min  Monitor vitals  Cues pacing  80-90 SPM Upright cycle  L5 15 min  Monitor vitals  Cues for pacing  >80 RPM Nustep  L5 15 min  Monitor vitials Nustep  L4 10 min  Monitor vitials            PWR focus Stand firm/foam  15 min  Cues   - Up  - Rock  - Twist  - Step    All 4's  - Up  - Twist w/ divided attention review All 4's, standing/firm  30 min  With EYE boosts, divided attention, function reaching    cues Standing/firm/foam  30 min  With EYE and hand boosts, divided attention, function reaching    cues            Gait with turns  resume 5 min  10 min with poles  Inside/outside  Mod cues              Gait with divided attention Gait fwd, side step, high marches w/ divided attention, head turns    resume   20 min    Threshold, turns, obstacles, steps various height with divided attention/word recall   Cues,  CS/CGA for LOB    Gait with head turns resume       Gait with bimanual manipulation                Tandem stance w/ ball toss W/ divided attention  Alt tandem stance every min as angelita  5 min       Floor transfers                Obstacle course        Fwd Step Dynamic reach  2x 10 resume      Step back and bow Dynamic reach  2x10 resume      Multi directional stepping 5 trials marching in place to 60 BPM,    4 trials ea multi direction - firm  Pt w/ self verbal commands and non verbal commands    4x to 60 BPM  2x to music 5 trials marching in place to 60 BPM,    4 trials ea multi direction - firm  Pt w/ self verbal commands and non verbal commands    4x to 60 BPM  2x to music 15 min  55-60 BPM    Level/red foam  Cues  Step stride length    CS Pt review   With pt provided handouts        Modalities

## 2020-03-02 ENCOUNTER — PROCEDURE VISIT (OUTPATIENT)
Dept: NEUROLOGY | Facility: CLINIC | Age: 75
End: 2020-03-02
Payer: MEDICARE

## 2020-03-02 DIAGNOSIS — G72.9 MYOPATHY: ICD-10-CM

## 2020-03-02 PROCEDURE — 95886 MUSC TEST DONE W/N TEST COMP: CPT | Performed by: PHYSICAL MEDICINE & REHABILITATION

## 2020-03-02 PROCEDURE — 95911 NRV CNDJ TEST 9-10 STUDIES: CPT | Performed by: PHYSICAL MEDICINE & REHABILITATION

## 2020-03-02 NOTE — PROGRESS NOTES
EMG 2 limb lower extremity     Date/Time 3/2/2020 9:35 AM     Performed by  Camille Ashby MD     Authorized by Ghanshyam Boykin MD      Universal Protocol Consent: Verbal consent obtained  Risks and benefits: risks, benefits and alternatives were discussed  Consent given by: patient  Patient understanding: patient states understanding of the procedure being performed  Patient consent: the patient's understanding of the procedure matches consent given  Patient identity confirmed: verbally with patient               EMG BILATERAL LOWER EXTREMITY  62-year-old right-handed male presents with left lower extremity weakness and frequent falls  He has had previous surgeries on the left foot several years ago  On physical examination, motor strength is 5/5 throughout except 4+/5 in the left lower extremity distally  Sensations are intact to light touch and pinprick bilaterally  Motor and sensory conduction studies were performed on the bilateral peroneal, tibial and sural nerves  The bilateral peroneal and right tibial compound motor action potentials were normal   The left tibial motor terminal latency was normal with a low compound motor action potential amplitude of 3 5 mV and a slow conduction velocity of 38 m/sec across the ankle  The right peroneal F-wave was normal   The left peroneal F-wave was prolonged at 57 1 milliseconds  The bilateral tibial F waves were prolonged at 60 0 and 59 5 milliseconds  The right sural and the bilateral superficial peroneal sensory action potentials were normal   The left sural peak latency was normal with a low sensory action potential amplitude of 2 microvolts        The right H  soleus response was normal   The left H soleus response was normal     Concentric needle EMG was performed on various distal and proximal muscles of the lower extremities bilaterally including EDB, AH,tibialis anterior, gastrocnemius medius, vastus lateralis, gluteus medius and the low lumbar paraspinal regions  There was no evidence of spontaneous activity seen  Moderate decreased recruitment of giant motor units was noted in the left AH  The compound motor unit potentials were of normal configuration and interference patterns were full or full for effort in the remaining muscles tested  IMPRESSION:  There is electrophysiologic evidence of a:    1  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

## 2020-03-03 ENCOUNTER — EVALUATION (OUTPATIENT)
Dept: PHYSICAL THERAPY | Facility: CLINIC | Age: 75
End: 2020-03-03
Payer: MEDICARE

## 2020-03-03 DIAGNOSIS — G20 PARKINSON'S DISEASE (HCC): Primary | ICD-10-CM

## 2020-03-03 PROCEDURE — 97112 NEUROMUSCULAR REEDUCATION: CPT | Performed by: PHYSICAL THERAPIST

## 2020-03-03 PROCEDURE — 97110 THERAPEUTIC EXERCISES: CPT | Performed by: PHYSICAL THERAPIST

## 2020-03-03 NOTE — PROGRESS NOTES
PT Evaluation     Today's date: 3/3/2020  Patient name: Sahara Peña  : 1945  MRN: [id number]  Referring provider: Lukasz Segundo DO  Dx:   Encounter Diagnosis     ICD-10-CM    1  Parkinson's disease ([facility] [address] ) G20                   Assessment  Assessment details: Patient is a 67 y/o male who presents to [facility] with the diagnosis of Parkinson's Disease, PSP  He notes improvements as noted, but with ongoing deficits with turns/divided attention  Patient requires increased cues, difficulty with performance with chaotic environment  Decreased reaction to perturbations/unanticipated activities  Easily distracted and able to be redirected  In need of ongoing PT to maximize safety  Cont per POC  Impairments: abnormal coordination, abnormal gait, abnormal or restricted ROM, activity intolerance, impaired balance, impaired physical strength, lacks appropriate home exercise program, safety issue and poor posture   Understanding of Dx/Px/POC: good   Prognosis: good    Goals  In 2 weeks, patient will:  1        Demonstrate ability to don (UB item of clothing)  in < 8 seconds without assistance - ONGOING  2  Demonstrate ability to don (LB item of clothing) in < 10 seconds - ONGOING  3  Demonstrate ability to ambulate through doorway/threshold without verbal cues or change in gait quality >80% of occasions - MET  4  Demonstrate ability to reach waist to shoulder/above head without posterior LOB - MET    In 4 weeks, patient will:  5  Demonstrate ability to ambulate forward holding item requiring bilateral UEs without LOB or change in gait quality - MET  6  Ascend stairs step over step with single UE support without change in movement quality - ONGOING  7  Demonstrate ability to roll bilaterally in <5 seconds without use of assistive equipment - MET  8        Demonstrate reduced fall risk based on functional outcome measure - ONGOING      Plan  Patient would benefit from: skilled physical therapy  Other planned modality interventions: Modalities prn for symptom management  Planned therapy interventions: manual therapy, neuromuscular re-education, therapeutic exercise and home exercise program  Frequency: 3x week  Duration in visits: 2  Duration in weeks: 4  Plan of Care beginning date: 3/3/2020  Plan of Care expiration date: 4/2/2020  Treatment plan discussed with: patient        Subjective Evaluation    History of Present Illness  Mechanism of injury: UPDATE:  Patient reports overall making improvements, does feel as if he still wants to work on turning and coordination of his LEs  Does continue to fatigue at times  Wife is helping him with his HEP  Doing them at least 5 days per week, he is not walking regularly, but is doing work outdoors  Patient reports he has had symptoms of PD for years  Notes that several years ago he had a cataract surgery requiring an artificial lens to be placed  Since that time he has a 4 inch difference between his eyes  He contributes a lot of his balance deficits to the change in his vision  Denies numbness/tingling/pain on a regular basis  Has had cortisone shots that seem to relieve the orthopedic pain  Primary complaints:  Legs started to get weaker in the last 3-4 months  Fatigues quicker than he would like  Entering/exiting bed  Walking  Negotiation of stairs  Clearing obstacles  Does not climb on ladders at this point  Difficulty with getting up/down from the floor  Things are just taking longer to do  Quality of life: good    Pain  No pain reported    Social Support  Steps to enter house: yes  Stairs in house: yes   Lives in: multiple-level home  Lives with: spouse    Employment status: not working (Retired, but very active)  Hand dominance: right      Diagnostic Tests  Abnormal MRI: IMPRESSION: No intracranial mass or abnormal enhancement  Moderate chronic microvascular    Treatments  Previous treatment: physical therapy ([facility])  Current treatment: physical therapy  Patient Goals  Patient goals for therapy: improved balance, independence with ADLs/IADLs and increased strength          Objective     Concurrent Complaints  Positive for visual change (h/o eye surgery/cataract, artificial lens  )  Negative for headaches, nausea/motion sickness, tinnitus, hearing loss, aural fullness, poor concentration and peripheral neuropathy    Strength/Myotome Testing     Left Hip   Planes of Motion   Flexion: 4  Extension: 3+  Abduction: 4    Right Hip   Planes of Motion   Flexion: 4  Extension: 3+  Abduction: 4    Left Knee   Flexion: 4  Extension: 4    Right Knee   Flexion: 4+  Extension: 4+    Left Ankle/Foot   Dorsiflexion: 5  Plantar flexion: 5    Right Ankle/Foot   Dorsiflexion: 5  Plantar flexion: 5  Neuro Exam:     Dizziness  Negative for disequilibrium, oscillopsia and diplopia  Headaches   Patient reports headaches: No      Cervical exam   Ligament Laxity Testing   Alar ligament: WNL  Sharp Karin:  WNL  Modified VBI   Left: asymptomatic  Right: asymptomatic    Oculomotor exam   Oculomotor ROM: WNL and Max encouragement  Resting nystagmus: not present   Gaze holding nystagmus: not present left  and not present right  Smooth pursuits: saccadic smooth pursuit    Sensation   Light touch LE: left WNL and right WNL  Proprioception LE: left WNL and right WNL    Coordination   Heel to shin: left WNL and right WNL  Rapid alternating movements: LE impaired     Functional outcomes   Functional outcome comment: Mini Best   Timed up and go with dual task (3 meter walk)  TU 8 Seconds  Dual Task TU 2 Seconds  Total Score: 22/28    Mini Best   Sit to Stand  2= Normal:  Comes to stand without use of hands and stabilizes independently     Rise to Toes  2= Normal:  Stable for 3 seconds with maximum height     Stand on one leg  1=Moderate: < 20 seconds  Right:  14 seconds  Left:  2 seconds      Compensatory Stepping Correction - Forward  2= Normal:  Recovers independently with a single, large step (2nd realignment step is allowed)  Compensatory Stepping Correction - Backward  1=Moderate: More than one step used to recover equilibrium     Compensatory Stepping Correction - Lateral  2= Normal:  Recovers independently with a single, large step (cross over or lateral OK)  Stance (Feet Together); Eyes open, Firm surface  2= Normal:  30 seconds     Stance (Feet Together); Eyes closed, Foam surface  1=Moderate: < 30 seconds (25 seconds)    Incline - Eyes Closed  1=Moderate:  Stands independently <30 sec OR aligns with surface (27 seconds)    Change in gait speed  2= Normal:  Significantly changes walking speed without imbalance     Walk with Head Turns - Horizontal  2= Normal:  Performs head turns with no change in gait speed and good balance     Walk with Pivot Turns  1= Moderate:  Turns with feet close SLOW (> or = to 4 steps) with good balance     Step over obstacles  2= Normal:  Able to step over box with minimal change of gait speed and with good balance    Timed up and go with dual task (3 meter walk)  TU 2 Seconds  Dual Task TU 6 Seconds  1= Moderate: Dual Task affects counting OR walking (>10%) when compared to the TUG without Dual Task    Total Score: 22/28      Functional outcome gait comment: Ambulates with decreased awareness of environmental challenges, cues to avoid obstacles intermittently  Patient attributes this to his visual challenges since surgery  Good foot clearance on level surfaces, increased attention to task required for negotiation of stairs  At re-eval:  Patient demonstrates improved heel strike and foot flat  Improved foot clearance with swing through  Continues with decreased environmental scanning               Precautions: Fall RISK  Re-eval Date: 3/3/2020    Date 2/20 2/21 2/25 2/27 3/3   Visit Count 6 7 8 9 10  1/10   FOTO  completed      Pain In 0 0   0   Pain Out 0 0   0     Manual         LE stretching Exercise Diary         VITALS /78 /78 /76 140/82    Aerobic Nustep  L5 15 min  Monitor vitals  Cues pacing  80-90 SPM Upright cycle  L5 15 min  Monitor vitals  Cues for pacing  >80 RPM Nustep  L5 15 min  Monitor vitials Nustep  L4 10 min  Monitor vitials SRB  L5 10 mins  Vitals  150/90           PWR focus Stand firm/foam  15 min  Cues   - Up  - Rock  - Twist  - Step    All 4's  - Up  - Twist w/ divided attention review All 4's, standing/firm  30 min  With EYE boosts, divided attention, function reaching    cues Standing/firm/foam  30 min  With EYE and hand boosts, divided attention, function reaching    cues Standing foam  30 min  With EYE  Boosts, environmental scanning divided attention, function reaching    cues           Gait with turns  resume 5 min  10 min with poles  Inside/outside  Mod cues              Gait with divided attention Gait fwd, side step, high marches w/ divided attention, head turns    resume   20 min    Threshold, turns, obstacles, steps various height with divided attention/word recall   Cues,  CS/CGA for LOB    Gait with head turns resume       Gait with bimanual manipulation                Tandem stance w/ ball toss W/ divided attention  Alt tandem stance every min as angelita  5 min       Floor transfers                Obstacle course        Fwd Step Dynamic reach  2x 10 resume      Step back and bow Dynamic reach  2x10 resume      Multi directional stepping 5 trials marching in place to 60 BPM,    4 trials ea multi direction - firm  Pt w/ self verbal commands and non verbal commands    4x to 60 BPM  2x to music 5 trials marching in place to 60 BPM,    4 trials ea multi direction - firm  Pt w/ self verbal commands and non verbal commands    4x to 60 BPM  2x to music 15 min  55-60 BPM    Level/red foam  Cues  Step stride length    CS Pt review   With pt provided handouts        Modalities

## 2020-03-03 NOTE — LETTER
March 3, 2020    Moses Gallardo DO  [address] [facility]  [address], [facility] [address]    Patient: Osvaldo Dejesus   YOB: 1945   Date of Visit: 3/3/2020     Encounter Diagnosis     ICD-10-CM    1  Parkinson's disease [facility]) G20        Dear Dr Amina Kerns: Thank you for your recent referral of Osvaldo Dejesus  Please review the attached evaluation summary from Al's recent visit  Please verify that you agree with the plan of care by signing the attached order  If you have any questions or concerns, please do not hesitate to call  I sincerely appreciate the opportunity to share in the care of one of your patients and hope to have another opportunity to work with you in the near future  Sincerely,    Lian Valadez      Referring Provider:      I certify that I have read the below Plan of Care and certify the need for these services furnished under this plan of treatment while under my care  Moses Gallardo, [address] [address]  VIA Facsimile: [phone]          PT Evaluation     Today's date: 3/3/2020  Patient name: Osvaldo Dejesus  : 1945  MRN: [id number]  Referring provider: Daniel Godwin DO  Dx:   Encounter Diagnosis     ICD-10-CM    1  Parkinson's disease ([facility] [address] ) G20                   Assessment  Assessment details: Patient is a 67 y/o male who presents to OPPT with the diagnosis of Parkinson's Disease, PSP  He notes improvements as noted, but with ongoing deficits with turns/divided attention  Patient requires increased cues, difficulty with performance with chaotic environment  Decreased reaction to perturbations/unanticipated activities  Easily distracted and able to be redirected  In need of ongoing PT to maximize safety  Cont per POC    Impairments: abnormal coordination, abnormal gait, abnormal or restricted ROM, activity intolerance, impaired balance, impaired physical strength, lacks appropriate home exercise program, safety issue and poor posture   Understanding of Dx/Px/POC: good   Prognosis: good    Goals  In 2 weeks, patient will:  1        Demonstrate ability to don (UB item of clothing)  in < 8 seconds without assistance - ONGOING  2  Demonstrate ability to don (LB item of clothing) in < 10 seconds - ONGOING  3  Demonstrate ability to ambulate through doorway/threshold without verbal cues or change in gait quality >80% of occasions - MET  4  Demonstrate ability to reach waist to shoulder/above head without posterior LOB - MET    In 4 weeks, patient will:  5  Demonstrate ability to ambulate forward holding item requiring bilateral UEs without LOB or change in gait quality - MET  6  Ascend stairs step over step with single UE support without change in movement quality - ONGOING  7  Demonstrate ability to roll bilaterally in <5 seconds without use of assistive equipment - MET  8  Demonstrate reduced fall risk based on functional outcome measure - ONGOING      Plan  Patient would benefit from: skilled physical therapy  Other planned modality interventions: Modalities prn for symptom management  Planned therapy interventions: manual therapy, neuromuscular re-education, therapeutic exercise and home exercise program  Frequency: 3x week  Duration in visits: 2  Duration in weeks: 4  Plan of Care beginning date: 3/3/2020  Plan of Care expiration date: 4/2/2020  Treatment plan discussed with: patient        Subjective Evaluation    History of Present Illness  Mechanism of injury: UPDATE:  Patient reports overall making improvements, does feel as if he still wants to work on turning and coordination of his LEs  Does continue to fatigue at times  Wife is helping him with his HEP  Doing them at least 5 days per week, he is not walking regularly, but is doing work outdoors  Patient reports he has had symptoms of PD for years    Notes that several years ago he had a cataract surgery requiring an artificial lens to be placed  Since that time he has a 4 inch difference between his eyes  He contributes a lot of his balance deficits to the change in his vision  Denies numbness/tingling/pain on a regular basis  Has had cortisone shots that seem to relieve the orthopedic pain  Primary complaints:  Legs started to get weaker in the last 3-4 months  Fatigues quicker than he would like  Entering/exiting bed  Walking  Negotiation of stairs  Clearing obstacles  Does not climb on ladders at this point  Difficulty with getting up/down from the floor  Things are just taking longer to do  Quality of life: good    Pain  No pain reported    Social Support  Steps to enter house: yes  Stairs in house: yes   Lives in: multiple-level home  Lives with: spouse    Employment status: not working (Retired, but very active)  Hand dominance: right      Diagnostic Tests  Abnormal MRI: IMPRESSION: No intracranial mass or abnormal enhancement  Moderate chronic microvascular  Treatments  Previous treatment: physical therapy (Tennis elboq)  Current treatment: physical therapy  Patient Goals  Patient goals for therapy: improved balance, independence with ADLs/IADLs and increased strength          Objective     Concurrent Complaints  Positive for visual change (h/o eye surgery/cataract, artificial lens  )  Negative for headaches, nausea/motion sickness, tinnitus, hearing loss, aural fullness, poor concentration and peripheral neuropathy    Strength/Myotome Testing     Left Hip   Planes of Motion   Flexion: 4  Extension: 3+  Abduction: 4    Right Hip   Planes of Motion   Flexion: 4  Extension: 3+  Abduction: 4    Left Knee   Flexion: 4  Extension: 4    Right Knee   Flexion: 4+  Extension: 4+    Left Ankle/Foot   Dorsiflexion: 5  Plantar flexion: 5    Right Ankle/Foot   Dorsiflexion: 5  Plantar flexion: 5  Neuro Exam:     Dizziness  Negative for disequilibrium, oscillopsia and diplopia       Headaches   Patient reports headaches: No      Cervical exam   Ligament Laxity Testing   Alar ligament: WNL  Sharp Karin: WNL  Modified VBI   Left: asymptomatic  Right: asymptomatic    Oculomotor exam   Oculomotor ROM: WNL and Max encouragement  Resting nystagmus: not present   Gaze holding nystagmus: not present left  and not present right  Smooth pursuits: saccadic smooth pursuit    Sensation   Light touch LE: left WNL and right WNL  Proprioception LE: left WNL and right WNL    Coordination   Heel to shin: left WNL and right WNL  Rapid alternating movements: LE impaired     Functional outcomes   Functional outcome comment: Mini Best   Timed up and go with dual task (3 meter walk)  TU 8 Seconds  Dual Task TU 2 Seconds  Total Score: 22/28    Mini Best   Sit to Stand  2= Normal:  Comes to stand without use of hands and stabilizes independently     Rise to Toes  2= Normal:  Stable for 3 seconds with maximum height     Stand on one leg  1=Moderate: < 20 seconds  Right:  14 seconds  Left:  2 seconds      Compensatory Stepping Correction - Forward  2= Normal:  Recovers independently with a single, large step (2nd realignment step is allowed)  Compensatory Stepping Correction - Backward  1=Moderate: More than one step used to recover equilibrium     Compensatory Stepping Correction - Lateral  2= Normal:  Recovers independently with a single, large step (cross over or lateral OK)  Stance (Feet Together); Eyes open, Firm surface  2= Normal:  30 seconds     Stance (Feet Together);  Eyes closed, Foam surface  1=Moderate: < 30 seconds (25 seconds)    Incline - Eyes Closed  1=Moderate:  Stands independently <30 sec OR aligns with surface (27 seconds)    Change in gait speed  2= Normal:  Significantly changes walking speed without imbalance     Walk with Head Turns - Horizontal  2= Normal:  Performs head turns with no change in gait speed and good balance     Walk with Pivot Turns  1= Moderate:  Turns with feet close SLOW (> or = to 4 steps) with good balance     Step over obstacles  2= Normal:  Able to step over box with minimal change of gait speed and with good balance    Timed up and go with dual task (3 meter walk)  TU 2 Seconds  Dual Task TU 6 Seconds  1= Moderate: Dual Task affects counting OR walking (>10%) when compared to the TUG without Dual Task    Total Score: 22/28      Functional outcome gait comment: Ambulates with decreased awareness of environmental challenges, cues to avoid obstacles intermittently  Patient attributes this to his visual challenges since surgery  Good foot clearance on level surfaces, increased attention to task required for negotiation of stairs  At re-eval:  Patient demonstrates improved heel strike and foot flat  Improved foot clearance with swing through  Continues with decreased environmental scanning               Precautions: Fall RISK  Re-eval Date: 3/3/2020    Date 2/20 2/21 2/25 2/27 3/3   Visit Count 6 7 8 9 10  1/10   FOTO  completed      Pain In 0 0   0   Pain Out 0 0   0     Manual         LE stretching          Exercise Diary         VITALS /78 /78 /76 140/82    Aerobic Nustep  L5 15 min  Monitor vitals  Cues pacing  80-90 SPM Upright cycle  L5 15 min  Monitor vitals  Cues for pacing  >80 RPM Nustep  L5 15 min  Monitor vitials Nustep  L4 10 min  Monitor vitials SRB  L5 10 mins  Vitals  150/90           PWR focus Stand firm/foam  15 min  Cues   - Up  - Rock  - Twist  - Step    All 4's  - Up  - Twist w/ divided attention review All 4's, standing/firm  30 min  With EYE boosts, divided attention, function reaching    cues Standing/firm/foam  30 min  With EYE and hand boosts, divided attention, function reaching    cues Standing foam  30 min  With EYE  Boosts, environmental scanning divided attention, function reaching    cues           Gait with turns  resume 5 min  10 min with poles  Inside/outside  Mod cues              Gait with divided attention Gait fwd, side step, high marches w/ divided attention, head turns    resume   20 min    Threshold, turns, obstacles, steps various height with divided attention/word recall   Cues,  CS/CGA for LOB    Gait with head turns resume       Gait with bimanual manipulation                Tandem stance w/ ball toss W/ divided attention  Alt tandem stance every min as angelita  5 min       Floor transfers                Obstacle course        Fwd Step Dynamic reach  2x 10 resume      Step back and bow Dynamic reach  2x10 resume      Multi directional stepping 5 trials marching in place to 60 BPM,    4 trials ea multi direction - firm  Pt w/ self verbal commands and non verbal commands    4x to 60 BPM  2x to music 5 trials marching in place to 60 BPM,    4 trials ea multi direction - firm  Pt w/ self verbal commands and non verbal commands    4x to 60 BPM  2x to music 15 min  55-60 BPM    Level/red foam  Cues  Step stride length    CS Pt review   With pt provided handouts        Modalities

## 2020-03-04 ENCOUNTER — OFFICE VISIT (OUTPATIENT)
Dept: PHYSICAL THERAPY | Facility: CLINIC | Age: 75
End: 2020-03-04
Payer: MEDICARE

## 2020-03-04 DIAGNOSIS — G20 PARKINSON'S DISEASE (HCC): Primary | ICD-10-CM

## 2020-03-04 PROCEDURE — 97110 THERAPEUTIC EXERCISES: CPT

## 2020-03-04 PROCEDURE — 97112 NEUROMUSCULAR REEDUCATION: CPT

## 2020-03-04 NOTE — PROGRESS NOTES
Daily Note     Today's date: 3/4/2020  Patient name: Isa Sanchez  : 1945  MRN: [id number]  Referring provider: Wilber Walter DO  Dx:   Encounter Diagnosis     ICD-10-CM    1  Parkinson's disease ([facility] [address] ) G20                   Subjective: I have EMG for my legs on monday      Objective: See treatment diary below      Assessment: Tolerated treatment well   1 LOB with ambulation inside and 1 LOB outside with CGA for balance recovery  Pt demonstrated good coordination with pivot turns/braiding  Pt with LOB with transition variable surface  Difficulty with multi directional stepping with mod cues today  Patient would benefit from continued PT 1-2 weeks per last RA      Plan: Continue per plan of care        Precautions: Fall RISK  Re-eval Date: 3/3/2020    Date 3/4       Visit Count 2/10       FOTO completed       Pain In        Pain Out 0         Manual         LE stretching          Exercise Diary         VITALS 124/82       Aerobic Nustep  L5 10 min  Cues /pacing   Vitals                 PWR focus Standing foam  30 min  With EYE  Boosts, environmental scanning divided attention, function reaching    cues               Gait with turns Indoors/ outside, variable surfaces, head turns, divided attention, change radames    20 min               Gait with divided attention        Gait with head turns        Gait with bimanual manipulation                Tandem stance w/ ball toss        Floor transfers        Pivot turns  R/L then f/b braiding  5 min  Level surface       Obstacle course        Fwd Step Foam  5 min  W/ eye/hand boosts       Step back and bow        Multi directional stepping 10 min firm/foam           Modalities

## 2020-03-09 ENCOUNTER — APPOINTMENT (OUTPATIENT)
Dept: PHYSICAL THERAPY | Facility: CLINIC | Age: 75
End: 2020-03-09
Payer: MEDICARE

## 2020-03-13 ENCOUNTER — APPOINTMENT (OUTPATIENT)
Dept: PHYSICAL THERAPY | Facility: CLINIC | Age: 75
End: 2020-03-13
Payer: MEDICARE

## 2020-03-17 ENCOUNTER — APPOINTMENT (OUTPATIENT)
Dept: PHYSICAL THERAPY | Facility: CLINIC | Age: 75
End: 2020-03-17
Payer: MEDICARE

## 2020-03-20 ENCOUNTER — APPOINTMENT (OUTPATIENT)
Dept: PHYSICAL THERAPY | Facility: CLINIC | Age: 75
End: 2020-03-20
Payer: MEDICARE

## 2020-03-22 NOTE — PROGRESS NOTES
PT Discharge 2/2 concerns over covid-19    Today's date: 3/21/2020  Patient name: Hamilton Felton  : 1945  MRN: [id number]  Referring provider: Fatmata Sauer DO  Dx:   Encounter Diagnosis     ICD-10-CM    1  Parkinson's disease ([facility] [address] ) Destiny Roman        Start Time: 1200  Stop Time: 56  Total time in clinic (min): 60 minutes    Assessment  Assessment details: Patient is a 67 y/o male who presents to OPPT with the diagnosis of Parkinson's Disease, PSP  He was seen for one session after re-eval   No formal discharge completed, no outcomes assessed  Self discharge over concerns of covid-19  Understanding of Dx/Px/POC: good   Prognosis: good    Goals  In 2 weeks, patient will:  1        Demonstrate ability to don (UB item of clothing)  in < 8 seconds without assistance - ONGOING  2  Demonstrate ability to don (LB item of clothing) in < 10 seconds - ONGOING  3  Demonstrate ability to ambulate through doorway/threshold without verbal cues or change in gait quality >80% of occasions - MET  4  Demonstrate ability to reach waist to shoulder/above head without posterior LOB - MET    In 4 weeks, patient will:  5  Demonstrate ability to ambulate forward holding item requiring bilateral UEs without LOB or change in gait quality - MET  6  Ascend stairs step over step with single UE support without change in movement quality - ONGOING  7  Demonstrate ability to roll bilaterally in <5 seconds without use of assistive equipment - MET  8  Demonstrate reduced fall risk based on functional outcome measure - ONGOING      Plan  Patient would benefit from: skilled physical therapy        Subjective Evaluation    History of Present Illness  Mechanism of injury: UPDATE:  Patient reports overall making improvements, does feel as if he still wants to work on turning and coordination of his LEs  Does continue to fatigue at times  Wife is helping him with his HEP    Doing them at least 5 days per week, he is not walking regularly, but is doing work outdoors  Patient reports he has had symptoms of PD for years  Notes that several years ago he had a cataract surgery requiring an artificial lens to be placed  Since that time he has a 4 inch difference between his eyes  He contributes a lot of his balance deficits to the change in his vision  Denies numbness/tingling/pain on a regular basis  Has had cortisone shots that seem to relieve the orthopedic pain  Primary complaints:  Legs started to get weaker in the last 3-4 months  Fatigues quicker than he would like  Entering/exiting bed  Walking  Negotiation of stairs  Clearing obstacles  Does not climb on ladders at this point  Difficulty with getting up/down from the floor  Things are just taking longer to do  Quality of life: good    Pain  No pain reported    Social Support  Steps to enter house: yes  Stairs in house: yes   Lives in: multiple-level home  Lives with: spouse    Employment status: not working (Retired, but very active)  Hand dominance: right      Diagnostic Tests  Abnormal MRI: IMPRESSION: No intracranial mass or abnormal enhancement  Moderate chronic microvascular  Treatments  Previous treatment: physical therapy (Tennis elboq)  Current treatment: physical therapy  Patient Goals  Patient goals for therapy: improved balance, independence with ADLs/IADLs and increased strength          Objective     Concurrent Complaints  Positive for visual change (h/o eye surgery/cataract, artificial lens  )   Negative for headaches, nausea/motion sickness, tinnitus, hearing loss, aural fullness, poor concentration and peripheral neuropathy    Strength/Myotome Testing     Left Hip   Planes of Motion   Flexion: 4  Extension: 3+  Abduction: 4    Right Hip   Planes of Motion   Flexion: 4  Extension: 3+  Abduction: 4    Left Knee   Flexion: 4  Extension: 4    Right Knee   Flexion: 4+  Extension: 4+    Left Ankle/Foot   Dorsiflexion: 5  Plantar flexion: 5    Right Ankle/Foot   Dorsiflexion: 5  Plantar flexion: 5  Neuro Exam:     Dizziness  Negative for disequilibrium, oscillopsia and diplopia  Headaches   Patient reports headaches: No      Cervical exam   Ligament Laxity Testing   Alar ligament: WNL  Sharp Karin: WNL  Modified VBI   Left: asymptomatic  Right: asymptomatic    Oculomotor exam   Oculomotor ROM: WNL and Max encouragement  Resting nystagmus: not present   Gaze holding nystagmus: not present left  and not present right  Smooth pursuits: saccadic smooth pursuit    Sensation   Light touch LE: left WNL and right WNL  Proprioception LE: left WNL and right WNL    Coordination   Heel to shin: left WNL and right WNL  Rapid alternating movements: LE impaired     Functional outcomes   Functional outcome comment: Mini Best   Timed up and go with dual task (3 meter walk)  TU 8 Seconds  Dual Task TU 2 Seconds  Total Score: 22/28    Mini Best   Sit to Stand  2= Normal:  Comes to stand without use of hands and stabilizes independently     Rise to Toes  2= Normal:  Stable for 3 seconds with maximum height     Stand on one leg  1=Moderate: < 20 seconds  Right:  14 seconds  Left:  2 seconds      Compensatory Stepping Correction - Forward  2= Normal:  Recovers independently with a single, large step (2nd realignment step is allowed)  Compensatory Stepping Correction - Backward  1=Moderate: More than one step used to recover equilibrium     Compensatory Stepping Correction - Lateral  2= Normal:  Recovers independently with a single, large step (cross over or lateral OK)  Stance (Feet Together); Eyes open, Firm surface  2= Normal:  30 seconds     Stance (Feet Together);  Eyes closed, Foam surface  1=Moderate: < 30 seconds (25 seconds)    Incline - Eyes Closed  1=Moderate:  Stands independently <30 sec OR aligns with surface (27 seconds)    Change in gait speed  2= Normal:  Significantly changes walking speed without imbalance     Walk with Head Turns - Horizontal  2= Normal:  Performs head turns with no change in gait speed and good balance     Walk with Pivot Turns  1= Moderate:  Turns with feet close SLOW (> or = to 4 steps) with good balance     Step over obstacles  2= Normal:  Able to step over box with minimal change of gait speed and with good balance    Timed up and go with dual task (3 meter walk)  TU 2 Seconds  Dual Task TU 6 Seconds  1= Moderate: Dual Task affects counting OR walking (>10%) when compared to the TUG without Dual Task    Total Score: 22/28      Functional outcome gait comment: Ambulates with decreased awareness of environmental challenges, cues to avoid obstacles intermittently  Patient attributes this to his visual challenges since surgery  Good foot clearance on level surfaces, increased attention to task required for negotiation of stairs  At re-eval:  Patient demonstrates improved heel strike and foot flat  Improved foot clearance with swing through  Continues with decreased environmental scanning        Flowsheet Rows      Most Recent Value   PT/OT G-Codes   Current Score  93   Projected Score  70

## 2020-12-03 ENCOUNTER — CONSULT (OUTPATIENT)
Dept: NEUROLOGY | Facility: CLINIC | Age: 75
End: 2020-12-03
Payer: MEDICARE

## 2020-12-03 VITALS — BODY MASS INDEX: 33.06 KG/M2 | WEIGHT: 230.4 LBS | SYSTOLIC BLOOD PRESSURE: 134 MMHG | DIASTOLIC BLOOD PRESSURE: 72 MMHG

## 2020-12-03 DIAGNOSIS — G23.1 PROGRESSIVE SUPRANUCLEAR PALSY (HCC): Primary | ICD-10-CM

## 2020-12-03 PROCEDURE — 99205 OFFICE O/P NEW HI 60 MIN: CPT | Performed by: PSYCHIATRY & NEUROLOGY

## 2020-12-03 RX ORDER — AMANTADINE HYDROCHLORIDE 100 MG/1
CAPSULE, GELATIN COATED ORAL
Qty: 60 CAPSULE | Refills: 4 | Status: SHIPPED | OUTPATIENT
Start: 2020-12-03 | End: 2021-04-15 | Stop reason: ALTCHOICE

## 2020-12-03 RX ORDER — LANOLIN ALCOHOL/MO/W.PET/CERES
CREAM (GRAM) TOPICAL DAILY
COMMUNITY

## 2020-12-04 ENCOUNTER — TELEPHONE (OUTPATIENT)
Dept: NEUROLOGY | Facility: CLINIC | Age: 75
End: 2020-12-04

## 2020-12-10 DIAGNOSIS — I10 BENIGN HYPERTENSION: ICD-10-CM

## 2020-12-11 RX ORDER — HYDROCHLOROTHIAZIDE 12.5 MG/1
TABLET ORAL
Qty: 90 TABLET | Refills: 3 | Status: SHIPPED | OUTPATIENT
Start: 2020-12-11

## 2021-01-15 ENCOUNTER — TELEPHONE (OUTPATIENT)
Dept: NEUROLOGY | Facility: CLINIC | Age: 76
End: 2021-01-15

## 2021-01-15 NOTE — TELEPHONE ENCOUNTER
Patient's wife calling to report the patient's been very weak for the past 2 weeks  She stated she needs to help him lift his legs to get into bed and he can't sit upright on his own  He requires assistance with everything  She reports he has had a cold since last week, he wasn't checked for COVID  Nose running and coughing  He had a slight temp of 100 but gave him some tylenol and fever resolved  Currently afebrile  She stated he's been eating and drinking, and seems very thirsty  He sees the PCP on 1/26 for his clearance for cataract surgery  She stated she's not sure if he should go through with the surgery  Stopped the amantadine about 1 week ago because of a rash he got from it  He stopped the depakote also because he wasn't sure if the rash was from the amantadine or the depakote  They think it was the amantadine though  I did advise them to contact PCP based on their description of sxs  Advised they make sure he is well hydrated also  She verbalized understanding  Please advise  Okay to leave vm

## 2021-01-15 NOTE — TELEPHONE ENCOUNTER
Would have him stay of medications for now until after he recovers from his illness  We can then reconsider any trials   Agree with contacting PCp regarding further workup for illness

## 2021-01-18 NOTE — TELEPHONE ENCOUNTER
Called to speak with patient's wife, patient answered the phone  Informed patient of below information in regards to holding his medications until he feels better  Also recommended he follow up with PCP about his current illness  Patient wanted me to inform you that he is going to be following up with a neurologist with [facility] to discuss clinical trials for medication  He also states that his face face has been red and burning due to his supranuclear palsy, has been using moisturizer for this

## 2021-03-10 DIAGNOSIS — Z23 ENCOUNTER FOR IMMUNIZATION: ICD-10-CM

## 2021-03-15 ENCOUNTER — TELEPHONE (OUTPATIENT)
Dept: NEUROLOGY | Facility: CLINIC | Age: 76
End: 2021-03-15

## 2021-03-15 ENCOUNTER — EVALUATION (OUTPATIENT)
Dept: SPEECH THERAPY | Facility: CLINIC | Age: 76
End: 2021-03-15
Payer: MEDICARE

## 2021-03-15 DIAGNOSIS — R13.10 DYSPHAGIA, UNSPECIFIED TYPE: ICD-10-CM

## 2021-03-15 DIAGNOSIS — G23.1 PROGRESSIVE SUPRANUCLEAR PALSY (HCC): Primary | ICD-10-CM

## 2021-03-15 DIAGNOSIS — R13.12 DYSPHAGIA, OROPHARYNGEAL PHASE: ICD-10-CM

## 2021-03-15 PROCEDURE — 92610 EVALUATE SWALLOWING FUNCTION: CPT | Performed by: NURSE PRACTITIONER

## 2021-03-15 RX ORDER — DONEPEZIL HYDROCHLORIDE 10 MG/1
10 TABLET, ORALLY DISINTEGRATING ORAL
Qty: 30 TABLET | Refills: 8 | Status: SHIPPED | OUTPATIENT
Start: 2021-03-15 | End: 2022-05-09 | Stop reason: ALTCHOICE

## 2021-03-15 NOTE — TELEPHONE ENCOUNTER
Dr Nelson Montelongo placed order  Called to follow up with Steff Enriquez but there was no answer  Left message on voicemail box for call back

## 2021-03-15 NOTE — TELEPHONE ENCOUNTER
Kati Ball, speech pathologist calling in, states patient is there for a VBS but there is no order  States he may have gotten this from [facility] but asking if Dr Leo Alejandro would like this done, if so, would need an order  Best call back, [phone]  Sent TT to Dr Leo Alejandro

## 2021-03-15 NOTE — TELEPHONE ENCOUNTER
Patient calling in, states he would like to increase his donepezil  Informed him we do not have this medication on file and to follow up with the prescriber for this  He states he was started on donepezil 5 mg daily from [facility] 2/8 but does not plan to follow up with them anymore  States he was prescribed this medication for his memory and does not feel a benefit from this  Would like to increase if possible  Has about 3 pills left and would like this to be filled at [facility] in [place] regardless of what dose  Best call back [phone], does not wish for us to leave messages  Would like to speak with him when we call

## 2021-03-15 NOTE — PROGRESS NOTES
DYSPHAGIA EVALUATION:     Patient Name: Niharika YBARRA Date: 3/15/2021     Problem List  Patient Active Problem List   Diagnosis    Benign essential HTN    Hypothyroidism    Progressive supranuclear palsy [facility])       Past Medical History  Past Medical History:   Diagnosis Date    History of echocardiogram 2018    Normal EF, Mild LVH    Hypertension     Hypothyroidism        Past Surgical History  No past surgical history on file  -Reason for referral: Signs/symptoms of dysphagia       -Subjective report of swallowing difficulty: Patient and wife report difficulty with liquids characterized by coughing/throat clearing and "going down the wrong pipe"  Pt also reports occasional difficulty swallowing solids reported 1x with cereal ( cheerios with milk) and dry crispy foods characterized by pharyngeal residue sensation  Pt denies any difficulty taking pills whole with thin liquids  Previous VBS history: no history        -Current diet (solids): Regular  -Current diet (liquids): Thin   Drinks from a cup, never drinks from a straw  Pt denies any weight loss  -Facial appearance Symmetrical   -Dentition Adequate   -Labial function Decreased ROM (bilateral)   -Lingual function WFL   -Velar function Symmetrical       Behavior/Cognition: alert    Speech/Language Status: able to participate in conversation  Patient Positioning: upright in chair    LIQUID CONSISTENCY TESTIN  Liquid Consistency (Thin and Nectar-thick)   Administered by: Cup and Self-fed   Strategies, attempts, and responses: Other: single small sips    CLINICAL FINDINGS:   Oral phase impairments: WFL Pt reports occasional mild anterior loss with liquids; however, this did not occur today   Pharyngeal Phase Impairments: suspected delay in the initiation of the swallow     *Laryngeal excursion upon palpation: slightly reduced hyolaryngeal elevation       Pt with s s of aspiration with larger cup sips of thin liquids characterized by cough 1x  There were no s s of aspiration with small controlled cup sips of thin liquids or all trials of nectar thick liquids ( small and large sips)  Pt was able to follow verbal direction for small sips and large sips  He verbalized his understanding of the importance of small sips  SOLID CONSISTENCY TESTIN  Solid Consistency (Regular, Mixed, Puree and dysphagia level 3 solids)   Administered by: Kelsey Filler and Self-fed   Strategies, attempts, and responses: Other: small bites     CLINICAL FINDINGS:   Oral phase impairments: slightly prolonged but effective bolus prep with regular harder solids and dryer solids   Pharyngeal Phase Impairments: suspected timely initiaition of the swallow     *Laryngeal excursion upon palpation: slightly reduced hyolaryngeal elevation  There were no s s of aspiration observed across all solid trials  Pt observed to be a fast eater and takes larger bites  He did confirm both of these habits at home as well  Verbalized his understanding the importance to take smaller bites chew well and slow down  SWALLOWING DIAGNOSTIC IMPRESSION:  -Swallowing diagnosis/severity: mild oropharyngeal phase dysphagia    -Factors affecting performance: mental status and following directions      -Safety concerns: Risk for choking, risk decreased with adherence to the following recommendations  Recommendations provided in writing and should be placed at the table as reminders during meals      -Risk factors: Progressive neurological disease    SAFETY PRECAUTIONS:  -Supervision: independent but recommend supervision during meals  Strategy reminders should be posted and verbal reminders as needed      -Strategies: Small sips and bites when eating, Slow rate, swallow between bites and No straw    -Positioning: Upright position at least 30 minutes after meal  Avoid neck extension      -Recommend (solids): dysphagia level 3 solids    -Recommend (liquids):  Thin small single cup sips  -Recommend (medications): in puree  REFERRALS: Videofluroscopic Swallow Study   Recommend physical therapy, patient declining at this time  Dysphagia treatment recommended: full POC to be determined pending video barium swallow study  Frequency of treatment: 1-2x per week  Pt/Family Education: initiated, verbal/written/visual demonstration regarding aspiration precautions, swallowing strategies, recommendations and oropharyngeal swallow function/anatomy  Pt and caregivers would benefit from/require continued education  STGS:  1  Patient will tolerate regular solids and thin liquids with strategies ( small bites/small sips/slow rate/chew well) independently without s s of aspiration  2  Patient will abide by aspiration precautions, abide by diet modifications and complete swallow maneuevers/strategies il'y in session and reported out of session  3  Patient will complete a Videofluroscopic swallow study to further evaluate oropharyngeal swallow function  LTG1:   Pt will tolerate the least restrictive diet without s s of aspiration across all settings  Pt will post swallowing strategies and diet recommendations at his dining table as a visual reminder during all meals

## 2021-03-15 NOTE — TELEPHONE ENCOUNTER
Jamal Sofia - speech pathologist - calling about swallow eval study - transferred call to Trang Johnston

## 2021-03-22 ENCOUNTER — OFFICE VISIT (OUTPATIENT)
Dept: SPEECH THERAPY | Facility: CLINIC | Age: 76
End: 2021-03-22
Payer: MEDICARE

## 2021-03-22 DIAGNOSIS — R13.12 DYSPHAGIA, OROPHARYNGEAL PHASE: ICD-10-CM

## 2021-03-22 DIAGNOSIS — G23.1 PROGRESSIVE SUPRANUCLEAR PALSY (HCC): Primary | ICD-10-CM

## 2021-03-22 PROCEDURE — 92526 ORAL FUNCTION THERAPY: CPT | Performed by: NURSE PRACTITIONER

## 2021-03-22 NOTE — PROGRESS NOTES
Speech-Language Pathology Treatment Note    Today's date: 3/22/2021  Patient name: Kevin Williamson  : 1945  MRN: [id number]  Referring provider: No ref  provider found  Dx:   Encounter Diagnosis     ICD-10-CM    1  Progressive supranuclear palsy (HCC)  G23 1    2  Dysphagia, oropharyngeal phase  R13 12      Medical History significant for:   Past Medical History:   Diagnosis Date    History of echocardiogram 2018    Normal EF, Mild LVH    Hypertension     Hypothyroidism      Flowsheet:  Start Time: 1000  Stop Time: 1100  Total time in clinic (min): 60 minutes    Subjective: Pt reports trying to keep straight but reports difficulty doing with while taking pills with water or when the water gets lower in the glass  Patient verbalized his understanding to avoid neck extension and reports he does not wish to take pills with applesauce but will be open to do this pending the VBS testing  Pt with some confusion surrounding thickened liquids and reasoning for all strategies  Pt reports eating steak last night and cut into bigger bites ( despite recs for small bites which he was able to recall) but denied any difficulty with larger bites of steak  Pt reporting sitting upright at all times while eating/drinking  Pt reports small sips are working OK with coffee but not with juice as he drinks faster  Pt posted aspiration precaution sheet on fridge, reporting he doesn't need any reminders  "my memory is sharp"  Reporting "some coughing" with meals since last session and I clear my throat "once in a while" but reports "I am doing fine"  Education provided at length regarding this and he did verbalize his understanding report "I appreciate your help but I am doing ok "     Pt is eating from a tablespoon more often as it is easier for him to grab the food vs  A fork  Pt reports eating slower is "on his mind" but not completing all the time  Pt reports he is trying to not fill the entire spoon with food   Pt reports his chewing is "substantial" and he is "doing fine"  Objective:  STGS:  1  Patient will tolerate regular solids and thin liquids with strategies ( small bites/small sips/slow rate/chew well) independently without s s of aspiration  3/22 tried straw sips today to try and avoid neck extension  Small controlled sips @ 100% without s s of aspiration  Able to keep head in neutral position entire time  Initially taking large sips but july able to to demonstrate small sips remainder of cup july  2  Patient will abide by aspiration precautions, abide by diet modifications and complete swallow maneuevers/strategies il'y in session and reported out of session  3/22 goal no met, not full compliance     3  Patient will complete a Videofluroscopic swallow study to further evaluate oropharyngeal swallow function  3/22 scheduled for Wednesday 3/24/21  LTG1:   Pt will tolerate the least restrictive diet without s s of aspiration across all settings  Pt will post swallowing strategies and diet recommendations at his dining table as a visual reminder during all meals  Assessment: No s s of aspiration observed across all trials  Pt reporting he continues to drive without any difficulty  Plan:  SAFETY PRECAUTIONS:  -Supervision: independent but recommend supervision during meals  Strategy reminders should be posted and verbal reminders as needed      -Strategies: Small sips and bites when eating, Slow rate, swallow between bites and No straw    -Positioning: Upright position at least 30 minutes after meal  Avoid neck extension      -Recommend (solids): dysphagia level 3 solids    -Recommend (liquids): Thin small single cup sips  -Recommend (medications): in puree  REFERRALS: Videofluroscopic Swallow Study   Recommend physical therapy, patient declining at this time  Dysphagia treatment recommended: full POC to be determined pending video barium swallow study     Frequency of treatment: 1-2x per week  Pt/Family Education: initiated, verbal/written/visual demonstration regarding aspiration precautions, swallowing strategies, recommendations and oropharyngeal swallow function/anatomy  Pt and caregivers would benefit from/require continued education      Homework:    Intervention Cycle:  3/15/21- 4/15/21    Visit: 2/

## 2021-03-24 ENCOUNTER — HOSPITAL ENCOUNTER (OUTPATIENT)
Dept: RADIOLOGY | Facility: HOSPITAL | Age: 76
Discharge: HOME | End: 2021-03-24
Payer: MEDICARE

## 2021-03-24 DIAGNOSIS — R13.10 DYSPHAGIA, UNSPECIFIED TYPE: ICD-10-CM

## 2021-03-24 DIAGNOSIS — G23.1 PROGRESSIVE SUPRANUCLEAR PALSY (HCC): ICD-10-CM

## 2021-03-24 PROCEDURE — 92611 MOTION FLUOROSCOPY/SWALLOW: CPT

## 2021-03-24 PROCEDURE — 74230 X-RAY XM SWLNG FUNCJ C+: CPT

## 2021-03-24 NOTE — PROCEDURES
Speech Pathology Modified Barium Swallow Study    Patient Name: Carlo SKY Date: 3/24/2021     Problem List  Active Problems:    * No active hospital problems  *      Past Medical History  Past Medical History:   Diagnosis Date    History of echocardiogram 01/23/2018    Normal EF, Mild LVH    Hypertension     Hypothyroidism        General Information;  Pt is a 70 y o  male with a PMH remarkable for HTN, hypothyroidism, and progressive supranuclear palsy  Current concerns for dysphagia include difficulty swallowing primarily liquids, likely due to PSP  MBS was recommended by pt's outpatient speech therapist to assess oropharyngeal stage swallowing skills and guide goal development  Pt was viewed in lateral position and was given trials of pureed, soft moist (apple sauce, sliced banana), solid food (turkey sandwich), nectar thick and thin liquid  A mixed consistency item (fruit with thin liquid) and a 13mm pill with thin liquid were also administered  Oral stage:  Pt presented with minimal oral stage dysphagia  Mastication was timely and grossly effective with materials administered today  Bolus formation and transfer were functional  Oral control appeared reduced, with premature spillage over the base of tongue  Bolus hold was [facility] given verbal cues  Pharyngeal stage:  Pt presented with mild-moderate pharyngeal dysphagia  Swallowing initiation was delayed, with head of bolus often reaching the pyriforms prior to swallow initiation  Hyolaryngeal rise and anterior displacement were mildly reduced  Epiglottic inversion was consistent and WFL  Airway closure/protection appeared delayed/reduced  Trace aspiration occurred several times with thin liquids (head in neutral position), mid swallow, and without cough response  Prep set and chin tuck with small sips of thin liquid did appear to prevent aspiration   Pt had a positive cough response x1 to a larger amount of aspiration, with a larger bolus of thin by straw without chin tuck  Trials of nectar thick without chin tuck still resulted in deep penetration/trace aspiration  Tongue base retraction and pharyngeal constriction appeared adequate, however with regular solid (sandwich) the bolus did pass slowly through the pharynx which may indicate some weakness  Strategies and Efficacy: Prep set and chin tuck with liquids eliminated/reduced aspiration as well as small sips rather than large    Aspiration Response and Efficacy: No cough response with trace chronic aspiration, strong cough response to larger amount or aspiration  Esophageal stage:  Esophageal screening was completed  Due to view with pt's body habitus/broad shoulders, unable to r/o aspiration of thin liquid with barium tablet  The tablet did become held up in the upper esophagus, but cleared given a bite of puree  Assessment Summary:    Pt presents with minimal oral and mild-mod pharyngeal dysphagia characterized by premature spillage, delayed swallow initiation, mildly reduced hyolaryngeal elevation, and reduced airway closure/protection  Trace silent aspiration seen fairly consistently with thin liquids and head in neutral position  Cough response noted with larger amount of aspiration  Small sips, prep set, and chin tuck noted to improve airway closure/protection  With pt's diagnosis of progressive supranuclear palsy, he would likely benefit from exercise program to strengthen the pharyngeal swallow (laryngeal rise, airway closure) and prevent or slow additional weakness  Recommend discussing VitlStim possibility with OP therapist, may not be indicated at this time but might be beneficial in the future if dysphagia progresses       Recommendations:   Recommended Diet:  regular diet and thin liquids   Recommended Form of Medications: whole with puree   Aspiration precautions and compensatory swallowing strategies: upright posture, small bites/sips, chin tuck with liquids, and brief bolus hold before swallowing thins  Consider referral to: Pt is following with neurology and is attending OP speech therapy  SLP Dysphagia therapy recommended: Yes, continue OP speech therapy    Results Reviewed with: patient and his wife   Pt/Family Education: initiated  Pt and caregivers would benefit from/require continued education  Pt will likely benefit from repeat MBS in the future      Dysphagia Goals per SLP:   - Strategies/manuvers to improve safety: prep set, chin tuck, small sips, not assessed today but may consider breath hold, hard swallow, possibly supraglottic swallow  - Swallow strengthening exercises targeting airway closure, hyolaryngeal elevation, and phsryngeal constriction

## 2021-03-29 ENCOUNTER — OFFICE VISIT (OUTPATIENT)
Dept: SPEECH THERAPY | Facility: CLINIC | Age: 76
End: 2021-03-29
Payer: MEDICARE

## 2021-03-29 DIAGNOSIS — R13.12 DYSPHAGIA, OROPHARYNGEAL PHASE: ICD-10-CM

## 2021-03-29 DIAGNOSIS — G23.1 PROGRESSIVE SUPRANUCLEAR PALSY (HCC): Primary | ICD-10-CM

## 2021-03-29 PROCEDURE — 92526 ORAL FUNCTION THERAPY: CPT

## 2021-03-29 NOTE — PROGRESS NOTES
Speech-Language Pathology Treatment Note    Today's date: 3/29/2021  Patient name: Homer Toth  : 1945  MRN: [id number]  Referring provider: No ref  provider found  Dx:   Encounter Diagnosis     ICD-10-CM    1  Progressive supranuclear palsy (HCC)  G23 1    2  Dysphagia, oropharyngeal phase  R13 12      Medical History significant for:   Past Medical History:   Diagnosis Date    History of echocardiogram 2018    Normal EF, Mild LVH    Hypertension     Hypothyroidism      Flowsheet:  Start Time: 1100  Stop Time: 1200  Total time in clinic (min): 60 minutes    Subjective: Patient able to report with high accuracy VBS therapist name, recommendations and items from the test itself  Patient reports that he "did pretty good I guess" and was able to recall chin tuck method  Results of test are included below  Objective:  1  Patient will tolerate regular solids and thin liquids with strategies ( small bites/small sips/slow rate/chew well) independently without s s of aspiration  3/22 tried straw sips today to try and avoid neck extension  Small controlled sips @ 100% without s s of aspiration  Able to keep head in neutral position entire time  Initially taking large sips but july able to to demonstrate small sips remainder of cup july  3/29:  Pt tolerating solids and thin liquids today via single straw sip @ 100% acc min cue  Patient required reminders to "tuck chin further" 2' safety during PO intake  2  Patient will abide by aspiration precautions, abide by diet modifications and complete swallow maneuevers/strategies il'y in session and reported out of session  3/22 goal not met, not full compliance     3  Patient will complete a Videofluroscopic swallow study to further evaluate oropharyngeal swallow function  3/22 scheduled for Wednesday 3/24/21  General Information;  Pt is a 70 y o  male with a PMH remarkable for HTN, hypothyroidism, and progressive supranuclear palsy   Current concerns for dysphagia include difficulty swallowing primarily liquids, likely due to PSP  MBS was recommended by pt's outpatient speech therapist to assess oropharyngeal stage swallowing skills and guide goal development  Pt was viewed in lateral position and was given trials of pureed, soft moist (apple sauce, sliced banana), solid food (turkey sandwich), nectar thick and thin liquid  A mixed consistency item (fruit with thin liquid) and a 13mm pill with thin liquid were also administered        Oral stage:  Pt presented with minimal oral stage dysphagia  Mastication was timely and grossly effective with materials administered today  Bolus formation and transfer were functional  Oral control appeared reduced, with premature spillage over the base of tongue  Bolus hold was [facility] given verbal cues       Pharyngeal stage:  Pt presented with mild-moderate pharyngeal dysphagia  Swallowing initiation was delayed, with head of bolus often reaching the pyriforms prior to swallow initiation  Hyolaryngeal rise and anterior displacement were mildly reduced  Epiglottic inversion was consistent and WFL  Airway closure/protection appeared delayed/reduced  Trace aspiration occurred several times with thin liquids (head in neutral position), mid swallow, and without cough response  Prep set and chin tuck with small sips of thin liquid did appear to prevent aspiration  Pt had a positive cough response x1 to a larger amount of aspiration, with a larger bolus of thin by straw without chin tuck  Trials of nectar thick without chin tuck still resulted in deep penetration/trace aspiration   Tongue base retraction and pharyngeal constriction appeared adequate, however with regular solid (sandwich) the bolus did pass slowly through the pharynx which may indicate some weakness      Strategies and Efficacy: Prep set and chin tuck with liquids eliminated/reduced aspiration as well as small sips rather than large     Aspiration Response and Efficacy: No cough response with trace chronic aspiration, strong cough response to larger amount or aspiration      Esophageal stage:  Esophageal screening was completed  Due to view with pt's body habitus/broad shoulders, unable to r/o aspiration of thin liquid with barium tablet  The tablet did become held up in the upper esophagus, but cleared given a bite of puree       Assessment Summary:    Pt presents with minimal oral and mild-mod pharyngeal dysphagia characterized by premature spillage, delayed swallow initiation, mildly reduced hyolaryngeal elevation, and reduced airway closure/protection  Trace silent aspiration seen fairly consistently with thin liquids and head in neutral position  Cough response noted with larger amount of aspiration  Small sips, prep set, and chin tuck noted to improve airway closure/protection  With pt's diagnosis of progressive supranuclear palsy, he would likely benefit from exercise program to strengthen the pharyngeal swallow (laryngeal rise, airway closure) and prevent or slow additional weakness  Recommend discussing VitlStim possibility with OP therapist, may not be indicated at this time but might be beneficial in the future if dysphagia progresses       Recommendations:   Recommended Diet:  regular diet and thin liquids   Recommended Form of Medications: whole with puree   Aspiration precautions and compensatory swallowing strategies: upright posture, small bites/sips, chin tuck with liquids, and brief bolus hold before swallowing thins      NEW 4  Patient will be evaluated and determined if NMES is indicated at this time  LTG1:   Pt will tolerate the least restrictive diet without s s of aspiration across all settings  Pt will post swallowing strategies and diet recommendations at his dining table as a visual reminder during all meals  Assessment: Patient did well in therapy today, verbalized understanding    Patient will be evaluated for NMES intervention and compliance at next appt to determine further POC  Patient provided swallowing therapy homework including effortful swallow, mendelsohn maneuver, dinesh maneuver, three second prep set, and chin tuck  Plan:  SAFETY PRECAUTIONS:  -Supervision: independent but recommend supervision during meals  Strategy reminders should be posted and verbal reminders as needed    -Strategies: Small sips and bites when eating, Slow rate, swallow between bites and No straw  -Positioning: Upright position at least 30 minutes after meal  Avoid neck extension    -Recommend (solids): dysphagia level 3 solids  -Recommend (liquids): Thin small single cup sips  -Recommend (medications): in puree  REFERRALS: Videofluroscopic Swallow Study   Recommend physical therapy, patient declining at this time  Dysphagia treatment recommended: full POC to be determined pending video barium swallow study  Frequency of treatment: 1-2x per week  Pt/Family Education: initiated, verbal/written/visual demonstration regarding aspiration precautions, swallowing strategies, recommendations and oropharyngeal swallow function/anatomy  Pt and caregivers would benefit from/require continued education      Homework:    Intervention Cycle:  3/15/21- 4/15/21    Visit: 3 /

## 2021-04-12 ENCOUNTER — APPOINTMENT (OUTPATIENT)
Dept: SPEECH THERAPY | Facility: CLINIC | Age: 76
End: 2021-04-12
Payer: MEDICARE

## 2021-04-15 ENCOUNTER — OFFICE VISIT (OUTPATIENT)
Dept: NEUROLOGY | Facility: CLINIC | Age: 76
End: 2021-04-15
Payer: MEDICARE

## 2021-04-15 VITALS
BODY MASS INDEX: 33.2 KG/M2 | DIASTOLIC BLOOD PRESSURE: 73 MMHG | SYSTOLIC BLOOD PRESSURE: 130 MMHG | WEIGHT: 231.4 LBS | HEART RATE: 60 BPM

## 2021-04-15 DIAGNOSIS — G23.1 PROGRESSIVE SUPRANUCLEAR PALSY (HCC): ICD-10-CM

## 2021-04-15 DIAGNOSIS — R45.86 MOOD SWINGS: Primary | ICD-10-CM

## 2021-04-15 PROCEDURE — 99214 OFFICE O/P EST MOD 30 MIN: CPT | Performed by: PSYCHIATRY & NEUROLOGY

## 2021-04-15 RX ORDER — GABAPENTIN 100 MG/1
100 CAPSULE ORAL
Qty: 30 CAPSULE | Refills: 5 | Status: SHIPPED | OUTPATIENT
Start: 2021-04-15 | End: 2022-05-09 | Stop reason: ALTCHOICE

## 2021-04-15 NOTE — PATIENT INSTRUCTIONS
Will continue donepezil  Will add gabapentin to see if this may help with mood swings and insomnia  If tolerates but ineffective we can try increasing the dose

## 2021-04-15 NOTE — PROGRESS NOTES
Patient ID: Zuri Toussaint is a 70 y o  male  Assessment/Plan:    Progressive supranuclear palsy (HCC)  Postural instability with bradykinesia and supranuclear palsy consistent with his diagnosis  More frequent falls as compared to when last seen  Previously seen by  Dr Oleg Gongora at [facility] so that he can be considered should he qualify for a trial in the future  They will call if study opens  Mood has been labile  Depakote was ineffective  He is having trouble with sleep  Will continue donepezil  Time spent answering questions as to the role of donepezil in treating cognition  Will add gabapentin to see if this may help with mood swings and insomnia  If tolerates but ineffective we can try increasing the dose  Encouraged to continue exercising  Diagnoses and all orders for this visit:    Mood swings  -     gabapentin (NEURONTIN) 100 mg capsule; Take 1 capsule (100 mg total) by mouth daily at bedtime    Progressive supranuclear palsy (HCC)  -     gabapentin (NEURONTIN) 100 mg capsule; Take 1 capsule (100 mg total) by mouth daily at bedtime           Subjective:    Mr Ramirez Michaud is a man with progressive supranuclear palsy who presents to the Movement disorder follow up  Previously followed at [facility] neurology  To review, symptoms began in 2018 with him falling while in the garden  He was diagnosed by Dr Chelsea Chan at [facility] in Feb 2019 ater being noted to have restrictions in vertical eye movements along with change in MRI consistent with PSP  Sinemet 25/100 1 tab qid associated with sedation  Sinemet 50/200 1 tab up to bid associated with leg swelling and diarrhea  "First seen on Sinemet Cr 50/200 1/2 tab qhs  He may have had a trial of amantadine but this is unclear  If he did he was on it for 1-2 weeks per his wife  Current medications:  Donepezil 10mg qhs    He retried the Sinemet 25/100 1 tab daily but this did not help so he discontinued it  He is falling about 2 times weekly   The other day he missed a step at the end of a ramp  Typically falls if stepping backward  He has difficulty arising from a chair  He ambulates independently with imbalance  Gait is slow  No freezing  Overall movements are slower  He is able to shower, dress and perform hygiene acts independently  He has adapted by sitting down and being cautious  He has right hand tremor  Saliva and drooling- mild   Chewing and swallowing- mild cough after eating  He underwent a swallow study  He is now being seen by speech therapy  Sleep maintenance -difficulty with initiation and then  2-3 awakening per night due to urinary frequency  Daytime sedation- mild with naps  No constipation  No lightheadedness on standing     Psychosis/ hallucinations- denies   Depression- denies  No pseudobulbar affect symptoms  He has an exercise bike but he has trouble doing it consistently  His wife does most of the driving   He drives his truck 10 minutes from the home with no issues  He no longer drives the car  Mood has changes  It is labile with him easily being irritable  He tried Depakote but dud nit seem to hep;            Objective:    Blood pressure 130/73, pulse 60, weight 105 kg (231 lb 6 4 oz)  Physical Exam  Eyes:      Pupils: Pupils are equal, round, and reactive to light  Neurological:      Mental Status: He is alert  Deep Tendon Reflexes: Strength normal          Neurological Exam  Mental Status  Alert  Oriented only to person, place and situation  Speech: hypophonia  Language is fluent with no aphasia  Attention and concentration are normal     Cranial Nerves  CN III, IV, VI: Decreased vertical   Hypometric saccades  Pupils equal round and reactive to light bilaterally  CN VII: Full and symmetric facial movement  CN VIII: Hearing is normal   CN IX, X: Palate elevates symmetrically  CN XI: Shoulder shrug strength is normal   CN XII: Tongue midline without atrophy or fasciculations      Motor   Normal muscle tone  Strength is 5/5 throughout all four extremities  Sensory  Light touch is normal in upper and lower extremities  Coordination  Right: Finger-to-nose normal  Rapid alternating movement abnormality:  Left: Finger-to-nose normal  Rapid alternating movement abnormality:  See MDS UPDRS III  Gait  Casual gait: Abnormal pull test  Unable to rise from chair without using arms  Decreased stride  En bloc turns         MDS UPDRS III                                4/ 15/21   Time since last dose:      Speech  0 0   Facial Expression  2 2   Rigidity - Neck  0 1   Rigidity - Upper Extremity (Right)  0 1   Rigidity - Upper Extremity (Left)   0 0   Rigidity - Lower Extremity (Right)  0 0   Rigidity - Lower Extremity (Left)   0 0   Finger Taps (Right)   2 2   Finger Taps (Left)   2 2   Hand Movement (Right)  1 2   Hand Movement (Left)   1 2   Pronation/Supination (Right)  1 2   Pronation/Supination (Left)   1 2   Toe Tapping (Right) 1 1   Toe Tapping (Left) 1 1   Leg Agility (Right)  0 0   Leg Agility (Left)   0 0   Arising from Chair   0 2   Gait   1 1   Freezing of Gait 0 0   Postural Stability   1 1   Posture 1 2 slight right tilt   Global spontaneity of movement 1 2   Postural Tremor (Right) 0 0   Postural Tremor (Left) 0 0   Kinetic Tremor (Right)  0 0   Kinetic Tremor (Left)  0 0   Rest tremor amplitude RUE 0 0   Rest tremor amplitude LUE 0 0   Rest tremor amplitude RLE 0 0   Reset tremor amplitude LLE 0 0   Lip/Jaw Tremor  0 0   Consistency of tremor 0 0   Motor Exam Total:                 ROS:    Review of Systems   Constitutional: Negative  Negative for appetite change and fever  HENT: Positive for voice change (at times)  Negative for hearing loss, tinnitus and trouble swallowing  Eyes: Negative  Negative for photophobia and pain  Respiratory: Negative  Negative for shortness of breath  Cardiovascular: Negative  Negative for palpitations  Gastrointestinal: Negative    Negative for nausea and vomiting  Endocrine: Negative  Negative for cold intolerance  Genitourinary: Negative  Negative for dysuria, frequency and urgency  Musculoskeletal: Positive for gait problem (Gait is unsteady)  Negative for myalgias and neck pain  Falling backwards  Balance is off  Cramps at night     Skin: Negative  Negative for rash  Allergic/Immunologic: Negative  Neurological: Positive for tremors (Mostly in right hand), speech difficulty (At times cant get word out) and weakness (legs)  Negative for dizziness, seizures, syncope, facial asymmetry, light-headedness, numbness and headaches  Hematological: Negative  Does not bruise/bleed easily  Psychiatric/Behavioral: Positive for sleep disturbance (Trouble staying asleep)  Negative for confusion and hallucinations  All other systems reviewed and are negative  Review of system was personally reviewed

## 2021-04-15 NOTE — ASSESSMENT & PLAN NOTE
Postural instability with bradykinesia and supranuclear palsy consistent with his diagnosis  More frequent falls as compared to when last seen  Previously seen by  Dr Michelle Pleitez at [facility] so that he can be considered should he qualify for a trial in the future  They will call if study opens  Mood has been labile  Depakote was ineffective  He is having trouble with sleep  Will continue donepezil  Time spent answering questions as to the role of donepezil in treating cognition  Will add gabapentin to see if this may help with mood swings and insomnia  If tolerates but ineffective we can try increasing the dose  Encouraged to continue exercising

## 2021-04-19 ENCOUNTER — OFFICE VISIT (OUTPATIENT)
Dept: SPEECH THERAPY | Facility: CLINIC | Age: 76
End: 2021-04-19
Payer: MEDICARE

## 2021-04-19 DIAGNOSIS — G23.1 PROGRESSIVE SUPRANUCLEAR PALSY (HCC): Primary | ICD-10-CM

## 2021-04-19 DIAGNOSIS — R13.12 DYSPHAGIA, OROPHARYNGEAL PHASE: ICD-10-CM

## 2021-04-19 PROCEDURE — 92526 ORAL FUNCTION THERAPY: CPT | Performed by: NURSE PRACTITIONER

## 2021-04-19 NOTE — PROGRESS NOTES
Speech-Language Pathology Treatment Note    Today's date: 2021  Patient name: Demond Matos  : 1945  MRN: [id number]  Referring provider: Tracy Lomas MD  Dx:   Encounter Diagnosis     ICD-10-CM    1  Progressive supranuclear palsy (HCC)  G23 1    2  Dysphagia, oropharyngeal phase  R13 12      Medical History significant for:   Past Medical History:   Diagnosis Date    History of echocardiogram 2018    Normal EF, Mild LVH    Hypertension     Hypothyroidism      Flowsheet:  Start Time: 1000  Stop Time: 1100  Total time in clinic (min): 60 minutes    Subjective: Patient able to report with high accuracy VBS therapist name, recommendations and items from the test itself  Patient reports that he "did pretty good I guess" and was able to recall chin tuck method  Results of test are included below  Objective:  1  Patient will tolerate regular solids and thin liquids with strategies ( small bites/small sips/slow rate/chew well) independently without s s of aspiration  3/22 tried straw sips today to try and avoid neck extension  Small controlled sips @ 100% without s s of aspiration  Able to keep head in neutral position entire time  Initially taking large sips but july able to to demonstrate small sips remainder of cup july  3/29:  Pt tolerating solids and thin liquids today via single straw sip @ 100% acc min cue  Patient required reminders to "tuck chin further" 2' safety during PO intake   Pt needed min cue for proper chin tuck posture as he is only partially completing  After min cue 1x, he was able to complete this posture correctly and july for remainder of session without any s s of aspiration across 4 oz of thin liquids via single cup sips  2  Patient will abide by aspiration precautions, abide by diet modifications and complete swallow maneuevers/strategies il'y in session and reported out of session   3/22 goal not met, not full compliance  able to demonstrate and complete chin tuck, oral prep set and breath hold swallow seperating and all together in session july today  3  Patient will complete a Videofluroscopic swallow study to further evaluate oropharyngeal swallow function  3/22 scheduled for Wednesday 3/24/21  General Information;  Pt is a 70 y o  male with a PMH remarkable for HTN, hypothyroidism, and progressive supranuclear palsy  Current concerns for dysphagia include difficulty swallowing primarily liquids, likely due to PSP  MBS was recommended by pt's outpatient speech therapist to assess oropharyngeal stage swallowing skills and guide goal development  Pt was viewed in lateral position and was given trials of pureed, soft moist (apple sauce, sliced banana), solid food (turkey sandwich), nectar thick and thin liquid  A mixed consistency item (fruit with thin liquid) and a 13mm pill with thin liquid were also administered        Oral stage:  Pt presented with minimal oral stage dysphagia  Mastication was timely and grossly effective with materials administered today  Bolus formation and transfer were functional  Oral control appeared reduced, with premature spillage over the base of tongue  Bolus hold was [facility] given verbal cues       Pharyngeal stage:  Pt presented with mild-moderate pharyngeal dysphagia  Swallowing initiation was delayed, with head of bolus often reaching the pyriforms prior to swallow initiation  Hyolaryngeal rise and anterior displacement were mildly reduced  Epiglottic inversion was consistent and WFL  Airway closure/protection appeared delayed/reduced  Trace aspiration occurred several times with thin liquids (head in neutral position), mid swallow, and without cough response  Prep set and chin tuck with small sips of thin liquid did appear to prevent aspiration  Pt had a positive cough response x1 to a larger amount of aspiration, with a larger bolus of thin by straw without chin tuck   Trials of nectar thick without chin tuck still resulted in deep penetration/trace aspiration  Tongue base retraction and pharyngeal constriction appeared adequate, however with regular solid (sandwich) the bolus did pass slowly through the pharynx which may indicate some weakness      Strategies and Efficacy: Prep set and chin tuck with liquids eliminated/reduced aspiration as well as small sips rather than large     Aspiration Response and Efficacy: No cough response with trace chronic aspiration, strong cough response to larger amount or aspiration      Esophageal stage:  Esophageal screening was completed  Due to view with pt's body habitus/broad shoulders, unable to r/o aspiration of thin liquid with barium tablet  The tablet did become held up in the upper esophagus, but cleared given a bite of puree       Assessment Summary:    Pt presents with minimal oral and mild-mod pharyngeal dysphagia characterized by premature spillage, delayed swallow initiation, mildly reduced hyolaryngeal elevation, and reduced airway closure/protection  Trace silent aspiration seen fairly consistently with thin liquids and head in neutral position  Cough response noted with larger amount of aspiration  Small sips, prep set, and chin tuck noted to improve airway closure/protection  With pt's diagnosis of progressive supranuclear palsy, he would likely benefit from exercise program to strengthen the pharyngeal swallow (laryngeal rise, airway closure) and prevent or slow additional weakness  Recommend discussing VitlStim possibility with OP therapist, may not be indicated at this time but might be beneficial in the future if dysphagia progresses       Recommendations:   Recommended Diet:  regular diet and thin liquids   Recommended Form of Medications: whole with puree   Aspiration precautions and compensatory swallowing strategies: upright posture, small bites/sips, chin tuck with liquids, and brief bolus hold before swallowing thins      NEW 4    Patient will be evaluated and determined if NMES is indicated at this time  4/19 patient declining NMES at this time, unable to commit to treatment program 2x per week for 4-6 weeks at this time  Will discuss again next session  NEW 5  Pt will complete swallow exercise program in session up to 15 reps of each exercise july  4/19 pt provided with exercise program to start implementing at home and will monitor for 15 reps of each exercise  Completing tongue tip resistance exercises, CTAR exercises, effortful swallow, dinesh, mendelsohn, shaker, hyoid lift, and supraglottic swallow  Max cue for mendelsohn today but able to complete 2 reps  NEW 6  Pt will complete IOPI to measure oral pressures  LTG1:   Pt will tolerate the least restrictive diet without s s of aspiration across all settings  Pt will post swallowing strategies and diet recommendations at his dining table as a visual reminder during all meals  Assessment: Patient did well in therapy today, verbalized understanding  Patient will be evaluated for NMES intervention and compliance at next appt to determine further POC  Patient provided swallowing therapy homework including effortful swallow, mendelsohn maneuver, dinesh maneuver, three second prep set, and chin tuck  Plan:  SAFETY PRECAUTIONS:  -Supervision: independent but recommend supervision during meals  Strategy reminders should be posted and verbal reminders as needed    -Strategies: Small sips and bites when eating, Slow rate, swallow between bites and No straw  -Positioning: Upright position at least 30 minutes after meal  Avoid neck extension    -Recommend (solids): dysphagia level 3 solids  -Recommend (liquids): Thin small single cup sips  -Recommend (medications): in puree  REFERRALS: Videofluroscopic Swallow Study   Recommend physical therapy, patient declining at this time  Dysphagia treatment recommended: full POC to be determined pending video barium swallow study     Frequency of treatment: 1-2x per week  Pt/Family Education: initiated, verbal/written/visual demonstration regarding aspiration precautions, swallowing strategies, recommendations and oropharyngeal swallow function/anatomy  Pt and caregivers would benefit from/require continued education      Homework:    Intervention Cycle:  3/15/21- 4/15/21    Visit: 4 /

## 2021-04-26 ENCOUNTER — OFFICE VISIT (OUTPATIENT)
Dept: SPEECH THERAPY | Facility: CLINIC | Age: 76
End: 2021-04-26
Payer: MEDICARE

## 2021-04-26 DIAGNOSIS — R13.12 DYSPHAGIA, OROPHARYNGEAL PHASE: ICD-10-CM

## 2021-04-26 DIAGNOSIS — G23.1 PROGRESSIVE SUPRANUCLEAR PALSY (HCC): Primary | ICD-10-CM

## 2021-04-26 PROCEDURE — 92526 ORAL FUNCTION THERAPY: CPT | Performed by: NURSE PRACTITIONER

## 2021-04-26 NOTE — PROGRESS NOTES
Speech-Language Pathology Treatment Note    Today's date: 2021  Patient name: Loren Vizcarra  : 1945  MRN: [id number]  Referring provider: No ref  provider found  Dx:   Encounter Diagnosis     ICD-10-CM    1  Progressive supranuclear palsy (HCC)  G23 1    2  Dysphagia, oropharyngeal phase  R13 12      Medical History significant for:   Past Medical History:   Diagnosis Date    History of echocardiogram 2018    Normal EF, Mild LVH    Hypertension     Hypothyroidism      Flowsheet:  Start Time: 1000  Stop Time: 1100  Total time in clinic (min): 60 minutes    Subjective: Patient arrived on time to session  Reports swallowing liquids better with chin tuck  Objective:  1  Patient will tolerate regular solids and thin liquids with strategies ( small bites/small sips/slow rate/chew well) independently without s s of aspiration  3/22 tried straw sips today to try and avoid neck extension  Small controlled sips @ 100% without s s of aspiration  Able to keep head in neutral position entire time  Initially taking large sips but july able to to demonstrate small sips remainder of cup july  3/29:  Pt tolerating solids and thin liquids today via single straw sip @ 100% acc min cue  Patient required reminders to "tuck chin further" 2' safety during PO intake   Pt needed min cue for proper chin tuck posture as he is only partially completing  After min cue 1x, he was able to complete this posture correctly and july for remainder of session without any s s of aspiration across 4 oz of thin liquids via single cup sips    Single cup sips of 4 oz  water with chin tuck 100% no s s of aspiration  2  Patient will abide by aspiration precautions, abide by diet modifications and complete swallow maneuevers/strategies il'y in session and reported out of session   3/22 goal not met, not full compliance  able to demonstrate and complete chin tuck, oral prep set and breath hold swallow seperating and all together in session july today  4/26 reminder 1x needed for chin tuck across entire session with thin liquids  3  Patient will complete a Videofluroscopic swallow study to further evaluate oropharyngeal swallow function  3/22 scheduled for Wednesday 3/24/21  General Information;  Pt is a 70 y o  male with a PMH remarkable for HTN, hypothyroidism, and progressive supranuclear palsy  Current concerns for dysphagia include difficulty swallowing primarily liquids, likely due to PSP  MBS was recommended by pt's outpatient speech therapist to assess oropharyngeal stage swallowing skills and guide goal development  Pt was viewed in lateral position and was given trials of pureed, soft moist (apple sauce, sliced banana), solid food (turkey sandwich), nectar thick and thin liquid  A mixed consistency item (fruit with thin liquid) and a 13mm pill with thin liquid were also administered        Oral stage:  Pt presented with minimal oral stage dysphagia  Mastication was timely and grossly effective with materials administered today  Bolus formation and transfer were functional  Oral control appeared reduced, with premature spillage over the base of tongue  Bolus hold was [facility] given verbal cues       Pharyngeal stage:  Pt presented with mild-moderate pharyngeal dysphagia  Swallowing initiation was delayed, with head of bolus often reaching the pyriforms prior to swallow initiation  Hyolaryngeal rise and anterior displacement were mildly reduced  Epiglottic inversion was consistent and WFL  Airway closure/protection appeared delayed/reduced  Trace aspiration occurred several times with thin liquids (head in neutral position), mid swallow, and without cough response  Prep set and chin tuck with small sips of thin liquid did appear to prevent aspiration  Pt had a positive cough response x1 to a larger amount of aspiration, with a larger bolus of thin by straw without chin tuck   Trials of nectar thick without chin tuck still resulted in deep penetration/trace aspiration  Tongue base retraction and pharyngeal constriction appeared adequate, however with regular solid (sandwich) the bolus did pass slowly through the pharynx which may indicate some weakness      Strategies and Efficacy: Prep set and chin tuck with liquids eliminated/reduced aspiration as well as small sips rather than large     Aspiration Response and Efficacy: No cough response with trace chronic aspiration, strong cough response to larger amount or aspiration      Esophageal stage:  Esophageal screening was completed  Due to view with pt's body habitus/broad shoulders, unable to r/o aspiration of thin liquid with barium tablet  The tablet did become held up in the upper esophagus, but cleared given a bite of puree       Assessment Summary:    Pt presents with minimal oral and mild-mod pharyngeal dysphagia characterized by premature spillage, delayed swallow initiation, mildly reduced hyolaryngeal elevation, and reduced airway closure/protection  Trace silent aspiration seen fairly consistently with thin liquids and head in neutral position  Cough response noted with larger amount of aspiration  Small sips, prep set, and chin tuck noted to improve airway closure/protection  With pt's diagnosis of progressive supranuclear palsy, he would likely benefit from exercise program to strengthen the pharyngeal swallow (laryngeal rise, airway closure) and prevent or slow additional weakness  Recommend discussing VitlStim possibility with OP therapist, may not be indicated at this time but might be beneficial in the future if dysphagia progresses       Recommendations:   Recommended Diet:  regular diet and thin liquids   Recommended Form of Medications: whole with puree   Aspiration precautions and compensatory swallowing strategies: upright posture, small bites/sips, chin tuck with liquids, and brief bolus hold before swallowing thins      NEW 4    Patient will be evaluated and determined if NMES is indicated at this time  4/19 patient declining NMES at this time, unable to commit to treatment program 2x per week for 4-6 weeks at this time  Will discuss again next session  4/26 patient does not wish to participate in NMES at this time as part of this treatment cycle  Goal discontinued  NEW 5  Pt will complete swallow exercise program in session up to 15 reps of each exercise july  4/19 pt provided with exercise program to start implementing at home and will monitor for 15 reps of each exercise  Completing tongue tip resistance exercises, CTAR exercises, effortful swallow, dinesh, mendelsohn, shaker, hyoid lift, and supraglottic swallow  Max cue for mendelsohn today but able to complete 2 reps  4/26 dinesh x10 reps in 10 minutes  NEW 6  Pt will complete IOPI to measure oral pressures  Goal met and WFL    The IOPI Pro is used by medical professionals to measure, evaluate, and increase the strength and endurance of the tongue and lip in patients with oral motor disorders, including dysphagia and dysarthria  The IOPI measures the maximum pressure (Pmax) a patient can produce in an air-filled bulb when it is compressed as hard as possible by the tongue or lip against a hard surface (e g  The palate or teeth, respectively)  Pmax is a measure of strength, expressed in kilopascals (kPa, an international unit of pressure)  For patients with dysphagia or dysarthria, oral motor fatigability may be of interest   The IOPI Pro can be used to assess tongue fatigability  Low endurance values are an indicator of a high fatigability  Endurance is measured with the IOPI Pro by quantifying the length of time that a patient can maintain 50% of his or her Pmax  This procedure is conducted in Target Mode by setting the target value to 50% of the patient's Pmax and timing how long the patient can hold the top (green) light on        The medical professional determines what target value is appropriate for exercise therapy purposes and provides specific instructions to the patient for a particular exercise protocol  In Target Mode, the pressure required to illuminate the green light a the top of the light array can be adjusted using the Set Target arrow buttons  This green light is used as a visual target for the patient  Tongue tip Peak Max after 3 trials:60  Numbers reflect WNL   Tongue back Peak Max after 3 trials:58  Numbers reflect WNL   Right lip Peak Max after 3 trials:31 32 Numbers reflect WNL   Left lip Peak Max after 3 trials: 34 Numbers reflect WNL         LTG1:   Pt will tolerate the least restrictive diet without s s of aspiration across all settings  Pt will post swallowing strategies and diet recommendations at his dining table as a visual reminder during all meals  Assessment: Patient did well in therapy today, verbalized understanding  Continue home program  Patient has no future appointments scheduled at this time, patient wishes to call to schedule future appointments  His wife was informed today as well  Plan:   SAFETY PRECAUTIONS:  -Supervision: independent but recommend supervision during meals  Strategy reminders should be posted and verbal reminders as needed    -Strategies: Small sips and bites when eating, Slow rate, swallow between bites and No straw  -Positioning: Upright position at least 30 minutes after meal  Avoid neck extension    -Recommend (solids): dysphagia level 3 solids  -Recommend (liquids): Thin small single cup sips  -Recommend (medications): in puree  REFERRALS: Videofluroscopic Swallow Study   Recommend physical therapy, patient declining at this time  Dysphagia treatment recommended: full POC to be determined pending video barium swallow study  Frequency of treatment: 1-2x per week    ( patient wishes to continue 1x per week)     Pt/Family Education: initiated, verbal/written/visual demonstration regarding aspiration precautions, swallowing strategies, recommendations and oropharyngeal swallow function/anatomy  Pt and caregivers would benefit from/require continued education      Homework:    Intervention Cycle:  3/15/21- 4/15/21    Visit: 5 /

## 2021-04-29 ENCOUNTER — TELEPHONE (OUTPATIENT)
Dept: NEUROLOGY | Facility: CLINIC | Age: 76
End: 2021-04-29

## 2021-04-29 NOTE — TELEPHONE ENCOUNTER
Pt and wife made aware of the below  I was able to print the form online  Form partially completed  Emailed to [facility] JOVANNY Downey, seble print and ask Dr Karlynn Kawasaki to review and sign  Once signed, pls mail the original (MD signature) to the address on file per pt's request  Pls scan         Thank you

## 2021-04-29 NOTE — TELEPHONE ENCOUNTER
Pt called and states that d/t his medical condition, he would like to get parking placard  Pt is asking if you would be agreeable to complete placard form? He will try to attach the form on his mychart  If unable to attach, he will mail the form to our office in [place]    Are you agreeable?

## 2021-05-03 ENCOUNTER — OFFICE VISIT (OUTPATIENT)
Dept: SPEECH THERAPY | Facility: CLINIC | Age: 76
End: 2021-05-03
Payer: MEDICARE

## 2021-05-03 DIAGNOSIS — G23.1 PROGRESSIVE SUPRANUCLEAR PALSY (HCC): Primary | ICD-10-CM

## 2021-05-03 DIAGNOSIS — R13.12 DYSPHAGIA, OROPHARYNGEAL PHASE: ICD-10-CM

## 2021-05-03 PROCEDURE — 92526 ORAL FUNCTION THERAPY: CPT | Performed by: NURSE PRACTITIONER

## 2021-05-03 NOTE — PROGRESS NOTES
Speech-Language Pathology Discharge    Today's date: 5/3/2021  Patient name: Federico Cheema  : 1945  MRN: [id number]  Referring provider: Que Coughlin DO  Dx:   Encounter Diagnosis     ICD-10-CM    1  Progressive supranuclear palsy (HCC)  G23 1    2  Dysphagia, oropharyngeal phase  R13 12      Medical History significant for:   Past Medical History:   Diagnosis Date    History of echocardiogram 2018    Normal EF, Mild LVH    Hypertension     Hypothyroidism      Flowsheet:  Start Time: 1400  Stop Time: 1500  Total time in clinic (min): 60 minutes    Subjective: Patient will avoid foods with "crumbs" and will avoid " cereal"  Substituted with eating eggs instead  Pt feels difficulty with thin liquids is "better"  Pt reports completing chin tuck poster "which is reportedly helping "   Pt continue to take pills whole with thin liquids with chin tuck without difficulty reported  Reportedly completing exercises 1x a day "as he remembers"  Completed VBS 3/24/21:   3/22 scheduled for Wednesday 3/24/21  General Information;  Pt is a 70 y o  male with a PMH remarkable for HTN, hypothyroidism, and progressive supranuclear palsy  Current concerns for dysphagia include difficulty swallowing primarily liquids, likely due to PSP  MBS was recommended by pt's outpatient speech therapist to assess oropharyngeal stage swallowing skills and guide goal development  Pt was viewed in lateral position and was given trials of pureed, soft moist (apple sauce, sliced banana), solid food (turkey sandwich), nectar thick and thin liquid  A mixed consistency item (fruit with thin liquid) and a 13mm pill with thin liquid were also administered        Oral stage:  Pt presented with minimal oral stage dysphagia  Mastication was timely and grossly effective with materials administered today  Bolus formation and transfer were functional  Oral control appeared reduced, with premature spillage over the base of tongue   Bolus hold was [facility] given verbal cues       Pharyngeal stage:  Pt presented with mild-moderate pharyngeal dysphagia  Swallowing initiation was delayed, with head of bolus often reaching the pyriforms prior to swallow initiation  Hyolaryngeal rise and anterior displacement were mildly reduced  Epiglottic inversion was consistent and WFL  Airway closure/protection appeared delayed/reduced  Trace aspiration occurred several times with thin liquids (head in neutral position), mid swallow, and without cough response  Prep set and chin tuck with small sips of thin liquid did appear to prevent aspiration  Pt had a positive cough response x1 to a larger amount of aspiration, with a larger bolus of thin by straw without chin tuck  Trials of nectar thick without chin tuck still resulted in deep penetration/trace aspiration  Tongue base retraction and pharyngeal constriction appeared adequate, however with regular solid (sandwich) the bolus did pass slowly through the pharynx which may indicate some weakness      Strategies and Efficacy: Prep set and chin tuck with liquids eliminated/reduced aspiration as well as small sips rather than large     Aspiration Response and Efficacy: No cough response with trace chronic aspiration, strong cough response to larger amount or aspiration      Esophageal stage:  Esophageal screening was completed  Due to view with pt's body habitus/broad shoulders, unable to r/o aspiration of thin liquid with barium tablet  The tablet did become held up in the upper esophagus, but cleared given a bite of puree       Assessment Summary:    Pt presents with minimal oral and mild-mod pharyngeal dysphagia characterized by premature spillage, delayed swallow initiation, mildly reduced hyolaryngeal elevation, and reduced airway closure/protection  Trace silent aspiration seen fairly consistently with thin liquids and head in neutral position  Cough response noted with larger amount of aspiration   Small sips, prep set, and chin tuck noted to improve airway closure/protection  With pt's diagnosis of progressive supranuclear palsy, he would likely benefit from exercise program to strengthen the pharyngeal swallow (laryngeal rise, airway closure) and prevent or slow additional weakness  Recommend discussing VitlStim possibility with OP therapist, may not be indicated at this time but might be beneficial in the future if dysphagia progresses       Recommendations:   Recommended Diet:  regular diet and thin liquids   Recommended Form of Medications: whole with puree   Aspiration precautions and compensatory swallowing strategies: upright posture, small bites/sips, chin tuck with liquids, and brief bolus hold before swallowing thins"        Objective:  1  Patient will tolerate regular solids and thin liquids with strategies ( small bites/small sips/slow rate/chew well) independently without s s of aspiration  3/22 tried straw sips today to try and avoid neck extension  Small controlled sips @ 100% without s s of aspiration  Able to keep head in neutral position entire time  Initially taking large sips but july able to to demonstrate small sips remainder of cup july  3/29:  Pt tolerating solids and thin liquids today via single straw sip @ 100% acc min cue  Patient required reminders to "tuck chin further" 2' safety during PO intake  4/19 Pt needed min cue for proper chin tuck posture as he is only partially completing  After min cue 1x, he was able to complete this posture correctly and july for remainder of session without any s s of aspiration across 4 oz of thin liquids via single cup sips  4/26  Single cup sips of 4 oz  water with chin tuck 100% no s s of aspiration  5/3 nelda cracker and 12 oz of thin liquids via cup no s s of aspiration observed across all PO trials  Pt completed strategies 100% acc       2  Patient will abide by aspiration precautions, abide by diet modifications and complete swallow maneuevers/strategies il'y in session and reported out of session  3/22 goal not met, not full compliance 4/19 able to demonstrate and complete chin tuck, oral prep set and breath hold swallow seperating and all together in session july today  4/26 reminder 1x needed for chin tuck across entire session with thin liquids  5/3 completed partial chin tuck, reporting a full chin tuck is "not comfortable and I run into more difficulty with that"  Able to demonstrate single sips  3  Patient will complete a Videofluroscopic swallow study to further evaluate oropharyngeal swallow function  Goal met 3/24/21  NEW 4  Patient will be evaluated and determined if NMES is indicated at this time  4/19 patient declining NMES at this time, unable to commit to treatment program 2x per week for 4-6 weeks at this time  Will discuss again next session  4/26 patient does not wish to participate in NMES at this time as part of this treatment cycle  Goal discontinued  NEW 5  Pt will complete swallow exercise program in session up to 15 reps of each exercise july  Goal met  dinesh x15, supraglottic swallow x15, mendelsohn x15, effortful swallow x15, shaker x15     NEW 6  Pt will complete IOPI to measure oral pressures  Goal met and WFL    The IOPI Pro is used by medical professionals to measure, evaluate, and increase the strength and endurance of the tongue and lip in patients with oral motor disorders, including dysphagia and dysarthria  The IOPI measures the maximum pressure (Pmax) a patient can produce in an air-filled bulb when it is compressed as hard as possible by the tongue or lip against a hard surface (e g  The palate or teeth, respectively)  Pmax is a measure of strength, expressed in kilopascals (kPa, an international unit of pressure)  For patients with dysphagia or dysarthria, oral motor fatigability may be of interest   The IOPI Pro can be used to assess tongue fatigability    Low endurance values are an indicator of a high fatigability  Endurance is measured with the IOPI Pro by quantifying the length of time that a patient can maintain 50% of his or her Pmax  This procedure is conducted in Target Mode by setting the target value to 50% of the patient's Pmax and timing how long the patient can hold the top (green) light on  The medical professional determines what target value is appropriate for exercise therapy purposes and provides specific instructions to the patient for a particular exercise protocol  In Target Mode, the pressure required to illuminate the green light a the top of the light array can be adjusted using the Set Target arrow buttons  This green light is used as a visual target for the patient  Tongue tip Peak Max after 3 trials:60  Numbers reflect WNL   Tongue back Peak Max after 3 trials:58  Numbers reflect WNL   Right lip Peak Max after 3 trials: 32 Numbers reflect WNL   Left lip Peak Max after 3 trials: 34 Numbers reflect WNL         LTG1:   Pt will tolerate the least restrictive diet without s s of aspiration across all settings  Goal met  Pt will post swallowing strategies and diet recommendations at his dining table as a visual reminder during all meals  Goal met      Assessment: Patient did well in therapy today, verbalized understanding  Continue home program  Patient has no future appointments scheduled at this time, patient wishes to call to schedule future appointments  His wife was informed today as well  Plan:   SAFETY PRECAUTIONS:  -Supervision: independent but recommend supervision during meals  Strategy reminders should be posted and verbal reminders as needed    -Strategies: Small sips and bites when eating, Slow rate, swallow between bites and No straw  -Positioning: Upright position at least 30 minutes after meal  Avoid neck extension    -Recommend (solids): regular solids  -Recommend (liquids):  Thin small single cup sips with chin tuck/breath hold  -Recommend (medications): whole in puree  ,patient taking whole with thin liquids despite recs  Continue home exercise swallow program over the next 4 weeks, 10x each 2-3x per day  Pt continues to declineswallowing therapy with NMES, goal met with strategies and tolerating diet well without s s of aspiration in session and no medical status change  Please to closely monitor dysphagia symptoms and f/u  Regarding increase in dysphagia symptoms  REFERRALS: Recommend physical therapy, patient declining at this time      Visit: 6 /

## 2021-05-11 NOTE — TELEPHONE ENCOUNTER
Paperwork has been signed and scanned and the paperwork has also been placed in the mail bin as well

## 2021-06-04 ENCOUNTER — TELEPHONE (OUTPATIENT)
Dept: NEUROLOGY | Facility: CLINIC | Age: 76
End: 2021-06-04

## 2021-06-04 DIAGNOSIS — G23.1 PROGRESSIVE SUPRANUCLEAR PALSY (HCC): Primary | ICD-10-CM

## 2021-06-04 NOTE — TELEPHONE ENCOUNTER
Anat Lozano called in, states that they need the script instead faxed to [phone] ([address])  States insurance covers this pharmacy  I faxed this as requested  She has no other questions or concerns

## 2021-06-04 NOTE — TELEPHONE ENCOUNTER
Script faxed      Called pt at  [phone] and left a message on his answering machine that rollator script was faxed

## 2021-06-04 NOTE — TELEPHONE ENCOUNTER
Pt called and states that he has a movement d/o  His gait has been unsteady and had h/o multiple falls  States that he is going on vac on the 6/20/21  He does not own a walker  He would like to get a   rollator walker w/ a seat and large wheels on the back  Requesting script to [facility] in [facility] [place] (phone# [phone])  Called [facility], spoke w/ [address] and advised of the above   Requesting script be faxed to [phone] ok to leave detailed message

## 2021-06-07 NOTE — TELEPHONE ENCOUNTER
Received notification that the script did not go through  Called [facility] at [phone], confirmed the fax number is [phone]  I faxed the script again

## 2021-06-09 NOTE — TELEPHONE ENCOUNTER
Received VM from [facility] [address] stating they need a detailed written order filled out by the doctor  Called [facility], spoke with Ed, he states he faxed this request twice but he was sending this to [phone]  I asked him to fax this to out [place] office ASAP [phone]  He confirmed he will do so

## 2021-06-09 NOTE — TELEPHONE ENCOUNTER
Patient stated [facility] told them they never received the fax  Informed patient per Renita Connelly, that we contacted them and confirmed their fax number and refaxed it again  Advised they have [facility] call us directly if there is still an issue

## 2021-06-09 NOTE — TELEPHONE ENCOUNTER
Detailed product description form completed by Dr Swartz Pean    Faxed to [phone] [place] [address]

## 2021-06-09 NOTE — TELEPHONE ENCOUNTER
Fax received  Handed to [facility] for Dr Karlynn Kawasaki

## 2021-07-19 ENCOUNTER — TELEPHONE (OUTPATIENT)
Dept: NEUROLOGY | Facility: CLINIC | Age: 76
End: 2021-07-19

## 2021-07-19 NOTE — TELEPHONE ENCOUNTER
Called pt and LMOM stating that I am calling in regards to r/s upcoming appt as Dr Nathen Armendariz will not be in the office on this date  I then asked the patient to please give us a call back to get this appt r/s for him

## 2021-07-20 ENCOUNTER — TELEPHONE (OUTPATIENT)
Dept: NEUROLOGY | Facility: CLINIC | Age: 76
End: 2021-07-20

## 2021-07-20 NOTE — TELEPHONE ENCOUNTER
Sent a staff message to [address] [facility] and practice administrator:      Patient called in very upset, and advised Frankey Bolder was suppose to call him  Both him and his wife were on the line; and it was a difficult call  I cancelled the appointment w/Andreia per patient request  He only wants to see Dr Nathen Armendariz and he "must" have an appointment this year  He declined me to schedule her first available and add to the wait list  I advised I would let Frankey Bolder know and have him follow up  I did advised of the December reschedule as well   Thank you!      -Phil Martin

## 2021-07-21 ENCOUNTER — TELEPHONE (OUTPATIENT)
Dept: NEUROLOGY | Facility: CLINIC | Age: 76
End: 2021-07-21

## 2021-07-21 NOTE — TELEPHONE ENCOUNTER
Patient called back again very upset  He is upset because he still has not heard from [facility]  I apologized and asked that he give some more time  I also advised I could escalate this to my manger  Patient is considering going elsewhere  I attempted to call practice admin for [facility]  I will keep an eye on this, so that patient is contacted

## 2021-07-21 NOTE — TELEPHONE ENCOUNTER
Called pt and advised that I am calling in regards to scheduling an appt with Dr Jonnie Uribe for the soonest available as he "needs an appt this year " I offered an appt on 07/28/2021 in our [facility] location to which he stated "I will not travel to [facility], I have been there twice already and do not want to go to that office again  I then offered soonest available in [place] which was on 08/16/2021 at 7:30 AM  Patient stated "this is too early and will not schedule this appt " The patient then stated "I scheduled this appt a 8 months ago and nobody seems to care" I then informed the patient that the reason we are r/s the original appt is due to the provider not being in the office  I offered the soonest available appts on Dr Jonnie Uribe schedule to which he did not want to schedule  Patient stated he is might call back to r/s later he is not sure

## 2021-07-21 NOTE — TELEPHONE ENCOUNTER
Called pt and advised that I am calling in regards to scheduling an appt with Dr Tanisha Andrade for the soonest available as he "needs an appt this year " I offered an appt on 07/28/2021 in our [facility] location to which he stated "I will not travel to [facility], I have been there twice already and do not want to go to that office again  I then offered soonest available in [place] which was on 08/16/2021 at 7:30 AM  Patient stated "this is too early and will not schedule this appt " The patient then stated "I scheduled this appt a 8 months ago (this appt was scheduled 3-4 months ago) and nobody seems to care" I then informed the patient that the reason we are r/s the original appt is due to the provider not being in the office  I offered the soonest available appts on Dr Tanisha Andrade schedule to which he did not want to schedule  Patient stated he is might call back to r/s later he is not sure

## 2021-08-03 ENCOUNTER — TELEPHONE (OUTPATIENT)
Dept: NEUROLOGY | Facility: CLINIC | Age: 76
End: 2021-08-03

## 2021-08-03 NOTE — TELEPHONE ENCOUNTER
Pt called to reschedule next appt sent to scheduling to assist     Pt questioning if okay to take Elavil  His PCP would like to start him on the medication and told pt to ask neurology  Please advise

## 2021-08-14 ENCOUNTER — HOSPITAL ENCOUNTER (EMERGENCY)
Facility: HOSPITAL | Age: 76
Discharge: HOME | End: 2021-08-14
Attending: EMERGENCY MEDICINE
Payer: MEDICARE

## 2021-08-14 VITALS
OXYGEN SATURATION: 95 % | BODY MASS INDEX: 33 KG/M2 | WEIGHT: 230 LBS | HEART RATE: 57 BPM | SYSTOLIC BLOOD PRESSURE: 171 MMHG | TEMPERATURE: 98.7 F | DIASTOLIC BLOOD PRESSURE: 87 MMHG | RESPIRATION RATE: 18 BRPM
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
## 2021-08-14 DIAGNOSIS — S51.811A SKIN TEAR OF FOREARM WITHOUT COMPLICATION, RIGHT, INITIAL ENCOUNTER: ICD-10-CM

## 2021-08-14 PROCEDURE — 99283 EMERGENCY DEPT VISIT LOW MDM: CPT

## 2021-08-14 PROCEDURE — 99284 EMERGENCY DEPT VISIT MOD MDM: CPT | Performed by: PHYSICIAN ASSISTANT

## 2021-08-14 NOTE — ED PROVIDER NOTES
History  Chief Complaint   Patient presents with   [facility] Fall     68-year-old male presents to the emergency department seeking evaluation for a skin tear to the right forearm that occur with the patient tripped and fell down 1 step  He does have a history of a form of Parkinson's dementia which regularly affects his balance  He does not take any blood thinning medications  The believes he may have bumped his head against a carpeted floor or the wall when he fell over there is no evidence of trauma he denies nausea vomiting headache weakness fatigue worsening balance or dizziness  He is otherwise well-appearing in no acute distress  He does not take aspirin  Allergies reviewed          Prior to Admission Medications   Prescriptions Last Dose Informant Patient Reported? Taking? Cholecalciferol (D3 ADULT PO)   Yes No   Sig: Take by mouth   Levothyroxine Sodium 88 MCG CAPS   Yes No   Sig: Take by mouth daily   amLODIPine (NORVASC) 5 mg tablet   No No   Sig: Take 1 tablet (5 mg total) by mouth daily   donepezil (ARICEPT ODT) 10 MG disintegrating tablet   No No   Sig: Take 1 tablet (10 mg total) by mouth daily at bedtime   gabapentin (NEURONTIN) 100 mg capsule   No No   Sig: Take 1 capsule (100 mg total) by mouth daily at bedtime   hydrochlorothiazide (HYDRODIURIL) 12 5 mg tablet   No No   Sig: Take 1 tablet by mouth once daily   vitamin B-12 (VITAMIN B-12) 1,000 mcg tablet   Yes No   Sig: Take by mouth daily      Facility-Administered Medications: None       Past Medical History:   Diagnosis Date    History of echocardiogram 2018    Normal EF, Mild LVH    Hypertension     Hypothyroidism        History reviewed  No pertinent surgical history  Family History   Problem Relation Age of Onset    Heart disease Mother          at age 59     I have reviewed and agree with the history as documented      E-Cigarette/Vaping    E-Cigarette Use Never User      E-Cigarette/Vaping Substances     Social History Tobacco Use    Smoking status: Never Smoker    Smokeless tobacco: Never Used   Vaping Use    Vaping Use: Never used   Substance Use Topics    Alcohol use: Not on file    Drug use: Not on file       Review of Systems   Constitutional: Negative for chills and fever  HENT: Negative for ear pain and sore throat  Eyes: Negative for pain and visual disturbance  Respiratory: Negative for cough and shortness of breath  Cardiovascular: Negative for chest pain and palpitations  Gastrointestinal: Negative for abdominal pain and vomiting  Genitourinary: Negative for dysuria and hematuria  Musculoskeletal: Negative for arthralgias and back pain  Skin: Positive for wound  Negative for color change and rash  Neurological: Negative for seizures and syncope  All other systems reviewed and are negative  Physical Exam  Physical Exam  Vitals and nursing note reviewed  Constitutional:       General: He is not in acute distress  Appearance: He is well-developed  He is not ill-appearing  HENT:      Head: Normocephalic and atraumatic  Right Ear: External ear normal       Left Ear: External ear normal       Nose: Nose normal    Eyes:      Pupils: Pupils are equal, round, and reactive to light  Cardiovascular:      Rate and Rhythm: Normal rate and regular rhythm  Heart sounds: Normal heart sounds  No murmur heard  No friction rub  No gallop  Pulmonary:      Effort: Pulmonary effort is normal  No respiratory distress  Breath sounds: Normal breath sounds  No stridor  No wheezing or rales  Abdominal:      General: Bowel sounds are normal  There is no distension  Palpations: Abdomen is soft  Tenderness: There is no abdominal tenderness  There is no guarding  Musculoskeletal:         General: No tenderness  Normal range of motion  Cervical back: Normal range of motion and neck supple  Skin:     General: Skin is warm        Capillary Refill: Capillary refill takes less than 2 seconds  Comments: Superficial skin tear and skin flap to the right forearm  Neurological:      Mental Status: He is alert and oriented to person, place, and time  Psychiatric:         Behavior: Behavior is cooperative  Vital Signs  ED Triage Vitals [08/14/21 1023]   Temperature Pulse Respirations Blood Pressure SpO2   98 7 °F (37 1 °C) 57 18 (!) 171/87 95 %      Temp Source Heart Rate Source Patient Position - Orthostatic VS BP Location FiO2 (%)   Oral Monitor Sitting Left arm --      Pain Score       No Pain           Vitals:    08/14/21 1023   BP: (!) 171/87   Pulse: 57   Patient Position - Orthostatic VS: Sitting         Visual Acuity      ED Medications  Medications - No data to display    Diagnostic Studies  Results Reviewed     None                 No orders to display              Procedures  Procedures         ED Course                             SBIRT 22yo+      Most Recent Value   SBIRT (24 yo +)   In order to provide better care to our patients, we are screening all of our patients for alcohol and drug use  Would it be okay to ask you these screening questions? Yes Filed at: 08/14/2021 1110   Initial Alcohol Screen: US AUDIT-C    1  How often do you have a drink containing alcohol?  0 Filed at: 08/14/2021 1110   2  How many drinks containing alcohol do you have on a typical day you are drinking? 0 Filed at: 08/14/2021 1110   3a  Male UNDER 65: How often do you have five or more drinks on one occasion? 0 Filed at: 08/14/2021 1110   3b  FEMALE Any Age, or MALE 65+: How often do you have 4 or more drinks on one occassion? 0 Filed at: 08/14/2021 1110   Audit-C Score  0 Filed at: 08/14/2021 1110   GABO: How many times in the past year have you    Used an illegal drug or used a prescription medication for non-medical reasons?   Never Filed at: 08/14/2021 1110                    MDM  Number of Diagnoses or Management Options  Fall, initial encounter  Skin tear of forearm without complication, right, initial encounter  Diagnosis management comments: Skin flap gently reapproximated  No need for anesthesia or sutures  Recommend local wound care  CT imaging offered for reported head strike however this was adamantly declined by patient and significant other   given red flag warning signs for head injuries  Follow-up as needed  Patient educated regarding their diagnosis and given return and follow-up instructions  Patient was advised to returned to the ED with worsening symptoms or concerns  Patient is understanding of and in agreement with the treatment plan  There are no questions at the time of discharge  Risk of Complications, Morbidity, and/or Mortality  Presenting problems: low  Diagnostic procedures: low  Management options: low    Patient Progress  Patient progress: stable      Disposition  Final diagnoses:   Fall, initial encounter   Skin tear of forearm without complication, right, initial encounter     Time reflects when diagnosis was documented in both MDM as applicable and the Disposition within this note     Time User Action Codes Description Comment    8/14/2021 11:00 AM [employer] Add [Q02  XXXA] Fall, initial encounter     8/14/2021 11:00 AM [employer] Add [H50 [id number]] Skin tear of forearm without complication, right, initial encounter       ED Disposition     ED Disposition Condition Date/Time Comment    Discharge Stable Sat Aug 14, 2021 11:00 AM Dalia Rodriguez discharge to home/self care              Follow-up Information     Follow up With Specialties Details Why Contact Info    Darian Uriarte DO Family Medicine   [id number]  [place] [address]  [phone]            Discharge Medication List as of 8/14/2021 11:00 AM      CONTINUE these medications which have NOT CHANGED    Details   amLODIPine (NORVASC) 5 mg tablet Take 1 tablet (5 mg total) by mouth daily, Starting u 2/14/2019, Print      Cholecalciferol (D3 ADULT PO) Take by mouth, Historical Med      donepezil (ARICEPT ODT) 10 MG disintegrating tablet Take 1 tablet (10 mg total) by mouth daily at bedtime, Starting Mon 3/15/2021, Normal      gabapentin (NEURONTIN) 100 mg capsule Take 1 capsule (100 mg total) by mouth daily at bedtime, Starting Thu 4/15/2021, Normal      hydrochlorothiazide (HYDRODIURIL) 12 5 mg tablet Take 1 tablet by mouth once daily, Normal      Levothyroxine Sodium 88 MCG CAPS Take by mouth daily, Historical Med      vitamin B-12 (VITAMIN B-12) 1,000 mcg tablet Take by mouth daily, Historical Med           No discharge procedures on file      PDMP Review     None          ED Provider  Electronically Signed by           Gosia Kennedy PA-C  08/14/21 4647

## 2021-11-28 ENCOUNTER — TELEPHONE (OUTPATIENT)
Dept: OTHER | Facility: OTHER | Age: 76
End: 2021-11-28

## 2021-12-06 ENCOUNTER — TELEPHONE (OUTPATIENT)
Dept: NEUROLOGY | Facility: CLINIC | Age: 76
End: 2021-12-06

## 2021-12-28 ENCOUNTER — TELEPHONE (OUTPATIENT)
Dept: NEUROLOGY | Facility: CLINIC | Age: 76
End: 2021-12-28

## 2022-01-03 ENCOUNTER — OFFICE VISIT (OUTPATIENT)
Dept: NEUROLOGY | Facility: CLINIC | Age: 77
End: 2022-01-03
Payer: MEDICARE

## 2022-01-03 VITALS
HEART RATE: 87 BPM | WEIGHT: 231 LBS | DIASTOLIC BLOOD PRESSURE: 84 MMHG | TEMPERATURE: 97.5 F | HEIGHT: 70 IN | BODY MASS INDEX: 33.07 KG/M2 | SYSTOLIC BLOOD PRESSURE: 171 MMHG

## 2022-01-03 DIAGNOSIS — G23.1 PROGRESSIVE SUPRANUCLEAR PALSY (HCC): Primary | ICD-10-CM

## 2022-01-03 DIAGNOSIS — R25.9 MIXED ACTION AND RESTING TREMOR: ICD-10-CM

## 2022-01-03 PROCEDURE — 99214 OFFICE O/P EST MOD 30 MIN: CPT | Performed by: NURSE PRACTITIONER

## 2022-01-03 RX ORDER — PROPRANOLOL HYDROCHLORIDE 10 MG/1
10 TABLET ORAL 2 TIMES DAILY
Qty: 60 TABLET | Refills: 2 | Status: SHIPPED | OUTPATIENT
Start: 2022-01-03 | End: 2022-05-09 | Stop reason: ALTCHOICE

## 2022-01-03 NOTE — PROGRESS NOTES
Patient ID: Brie Taylor is a 78 y o  male  Assessment/Plan:    Progressive supranuclear palsy ([facility] [address] )  Patient continues with progression of symptoms related to PSP, mainly bradykinesia, postural instability, mixed resting and action tremors  More frequent falls as compared to seen  He has side effects with Sinemet and amantadine  No improvement in mood or gait with donepezil  His mood remains labile, Depakote was ineffective  He did trial low-dose of gabapentin with no improvement, but never increased dose past 200 mg  He does question usefulness of medical marijuana, however given possible sedation and his risk for falls would be cautious  Plan:  -retrial gabapentin higher dose, take 300 mg q h s  to see if assists with mood and sleep  -refer to palliative care, can address medical marijuana as well as other aspects of medical care  -patient decline referral to PT to work on gait and balance training, I did advise him to use walker more regularly to prevent falls  -for tremor we can trial low-dose propanolol as he seems most bothered by action tremor, if he has any dizziness should contact the office immediately  Will start propanolol 10 mg BID, if no improvement in 1 week will further titrate  They will monitor BP at home  Would be cautious against other medications for action tremor due to potential cognitive and balance side effects  -patient declined referral to OT to assess for adaptive equipment for tremor, we did discuss weighted utensils in which his wife will look into    Plan For follow-up in 4 months with attending    He also presents for clearance for anesthesia for colonoscopy  In my opinion he can proceed with the procedure with caution  I would be mainly concerned with his swallow ability and fall risk following anesthesia  I advise that staff observed patient with 1st sips of clears following procedure and observed for any difficulty swallowing    Would also recommend that patient be assisted by staff for ambulation and transfers to prevent falls  It could also be beneficial for his wife to be present to assist        Diagnoses and all orders for this visit:    Progressive supranuclear palsy ([address] )  -     propranolol (INDERAL) 10 mg tablet; Take 1 tablet (10 mg total) by mouth 2 (two) times a day  -     Ambulatory referral to Palliative Care; Future    Mixed action and resting tremor           Subjective:    HPI    Mr Ronda Vernon is a man with progressive supranuclear palsy who presents to the Movement disorder follow up  Previously followed at [facility] neurology  To review, symptoms began in 2018 with him falling while in the garden  He was diagnosed by Dr Sahara Cutler at [facility] in Feb 2019 ater being noted to have restrictions in vertical eye movements along with change in MRI consistent with PSP  Sinemet 25/100 1 tab qid associated with sedation  Sinemet 50/200 1 tab up to bid associated with leg swelling and diarrhea  "First seen on Sinemet Cr 50/200 1/2 tab qhs  He may have had a trial of amantadine but this is unclear  If he did he was on it for 1-2 weeks per his wife  Last office visit 4/2021 in which he was having frequent falls  His mood was labile and he was started on gabapentin to help with mood and sleep  He presents today for clearance for anesthesia for colonoscopy  Past Medication:  Sinemet-no significant improvement, leg swelling  Amantadine-rash  Donepezil 10mg qhs-unclear why stopped, maybe no benefit  Gabapentin-no improvement      Current medications:       Interval History:  Tremor getting worse-did take gabapentin 200 mg at bedtime which did not help with tremor  He has a hard time feeding himself or drinking from a cup due to tremor  He didn't feel improvement in mood or sleep with gabapentin  Mood is stable, he does have short patience  His sleeps get interrupted due to urinary frequency a few times a night     His balance is worsening, needs to assistance to stand with certain chairs  He has at least one fall a week for the past few months, tends to fall backwards, fall over or if he leans too far forward  He does have difficulty getting in and out of a car  He performs ADLs with some minor assistance with socks and shoes-he is able to shave and shower  He has a walker but he does not use it  He has done PT in the past    He did ST in the spring, he did have a swallow evaluation at that time in which he had minimal oral and mild-mod pharyngeal dysphagia-recommended regular diet but pills in puree  He takes his pills with juice and does well  No hallucinations or dizziness  He does drool occasionally  He exercises daily-sit down elliptical 10-15 mins  He has had no complications with anesthesia in the past, he had cataract surgery in the last year in which he did well  No SOB or chest pain  No weakness  He needs clearance            Objective:    Blood pressure (!) 171/84, pulse 87, temperature 97 5 °F (36 4 °C), temperature source Temporal, height 5' 10" (1 778 m), weight 105 kg (231 lb)  Physical Exam  Constitutional:       General: He is awake  Eyes:      Pupils: Pupils are equal, round, and reactive to light  Neurological:      Mental Status: He is alert  Deep Tendon Reflexes: Strength normal          Neurological Exam  Mental Status  Awake and alert  Oriented only to person, place and situation  Speech: hypophonia  Language is fluent with no aphasia  Attention and concentration are normal     Cranial Nerves  CN III, IV, VI: Decreased vertical   Hypometric saccades  Pupils equal round and reactive to light bilaterally  CN V:  Right: Facial sensation is normal   Left: Facial sensation is normal on the left  CN VII: Full and symmetric facial movement  CN VIII: Hearing is normal   CN XI: Shoulder shrug strength is normal   CN XII: Tongue midline without atrophy or fasciculations  Motor   Normal muscle tone   Strength is 5/5 throughout all four extremities  Sensory  Light touch is normal in upper and lower extremities  Coordination  Right: Finger-to-nose normal  Rapid alternating movement abnormality:  Left: Finger-to-nose normal  Rapid alternating movement abnormality:  See MDS UPDRS III  Gait  Casual gait: Unable to rise from chair without using arms  Decreased stride  En bloc turns, re-emergence of tremor in b/l hands  MDS UPDRS III                              1/3/22 4/ 15/21   Time since last dose:      Speech  0 0   Facial Expression  2 2   Rigidity - Neck  1 1   Rigidity - Upper Extremity (Right)  0 1   Rigidity - Upper Extremity (Left)   0 0   Rigidity - Lower Extremity (Right)  0 0   Rigidity - Lower Extremity (Left)   0 0   Finger Taps (Right)   2 2   Finger Taps (Left)   2 2   Hand Movement (Right)  2 2   Hand Movement (Left)   2 2   Pronation/Supination (Right)  2 2   Pronation/Supination (Left)   2 2   Toe Tapping (Right) 1 1   Toe Tapping (Left) 1 1   Leg Agility (Right)  0 0   Leg Agility (Left)   0 0   Arising from Chair   2 2   Gait   1 1   Freezing of Gait 0 0   Postural Stability    1   Posture 2 2 slight right tilt   Global spontaneity of movement 2 2   Postural Tremor (Right) 0 0   Postural Tremor (Left) 0 0   Kinetic Tremor (Right)  2 0   Kinetic Tremor (Left)  2 0   Rest tremor amplitude RUE 2 0   Rest tremor amplitude LUE 2 0   Rest tremor amplitude RLE 0 0   Reset tremor amplitude LLE 0 0   Lip/Jaw Tremor  0 0   Consistency of tremor 3 0   Motor Exam Total:           I have personally reviewed the ROS performed by the MA     ROS:    Review of Systems   Constitutional: Negative  Negative for appetite change and fever  HENT: Negative  Negative for hearing loss, tinnitus, trouble swallowing and voice change  Eyes: Positive for photophobia  Negative for pain  Respiratory: Negative  Negative for shortness of breath  Cardiovascular: Negative  Negative for palpitations  Gastrointestinal: Negative  Negative for nausea and vomiting  Endocrine: Negative  Negative for cold intolerance  Genitourinary: Negative  Negative for dysuria, frequency and urgency  Musculoskeletal: Negative  Negative for myalgias and neck pain  Skin: Negative  Negative for rash  Neurological: Positive for tremors (arms mostly right arm), speech difficulty and weakness (legs mostly left)  Negative for dizziness, seizures, syncope, facial asymmetry, light-headedness, numbness and headaches  Hematological: Negative  Does not bruise/bleed easily  Psychiatric/Behavioral: Negative  Negative for confusion, hallucinations and sleep disturbance

## 2022-01-03 NOTE — ASSESSMENT & PLAN NOTE
Patient continues with progression of symptoms related to PSP, mainly bradykinesia, postural instability, mixed resting and action tremors  More frequent falls as compared to seen  He has side effects with Sinemet and amantadine  No improvement in mood or gait with donepezil  His mood remains labile, Depakote was ineffective  He did trial low-dose of gabapentin with no improvement, but never increased dose past 200 mg  He does question usefulness of medical marijuana, however given possible sedation and his risk for falls would be cautious  Plan:  -retrial gabapentin higher dose, take 300 mg q h s  to see if assists with mood and sleep  -refer to palliative care, can address medical marijuana as well as other aspects of medical care  -patient decline referral to PT to work on gait and balance training, I did advise him to use walker more regularly to prevent falls  -for tremor we can trial low-dose propanolol as he seems most bothered by action tremor, if he has any dizziness should contact the office immediately  Will start propanolol 10 mg BID, if no improvement in 1 week will further titrate  They will monitor BP at home  Would be cautious against other medications for action tremor due to potential cognitive and balance side effects  -patient declined referral to OT to assess for adaptive equipment for tremor, we did discuss weighted utensils in which his wife will look into    Plan For follow-up in 4 months with attending    He also presents for clearance for anesthesia for colonoscopy  In my opinion he can proceed with the procedure with caution  I would be mainly concerned with his swallow ability and fall risk following anesthesia  I advise that staff observed patient with 1st sips of clears following procedure and observed for any difficulty swallowing  Would also recommend that patient be assisted by staff for ambulation and transfers to prevent falls    It could also be beneficial for his wife to be present to assist

## 2022-01-03 NOTE — PATIENT INSTRUCTIONS
Recommend to restart gabapentin at 300 mg at bedtime    Can remain off of donepezil    Ok to proceed with colonoscopy-will need to be careful with swallow, fall risk    Consider PT/OT consult in the future

## 2022-01-07 ENCOUNTER — TELEPHONE (OUTPATIENT)
Dept: PALLIATIVE MEDICINE | Facility: CLINIC | Age: 77
End: 2022-01-07

## 2022-01-07 NOTE — TELEPHONE ENCOUNTER
Medical Marijuana Pre-Visit Screening for Palliative & Supportive Care    Referral Source: Neurology  Andreia   Erwinjeannette DONG  Diagnosis: Progressive Supranuclear Palsy  Is the diagnosis an approved serious medical condition as outlined by [place] Act [place]: YES  History/Symptoms:   bradykinesia, postural instability, mixed resting and action tremors    Does the patient's diagnosis fall within the current scope of our Palliative & Supportive Care practice: YES  Does the patient intend to use MMJ with palliative intent: yes    Does the patient currently have a signed controlled substances contract with another provider: NO  Is the patient a resident of [place] with a valid state ID or 's license: Yes  Has the patient registered on the [address] [place] [address]  website: Yes  [email or web address]     Prior to any scheduled visit, the patient has been informed of the following:  · [facility] providers are knowledgeable about many ways to help people  A visit to discuss MMJ does not mean that the provider agrees that this is the best way to help you and may make other recommendations  · There is an expectation and requirement by the [place] MMJ law for continuity of care if your certification is completed  · You will be expected to sign an informed consent  · A PDMP report will be reviewed before your visit  · This may effect your ability to purchase a handgun  · Medical marijuana products are not covered by insurance  The dispensaries do not accept credit cards  · The certification does not exempt you from any employer based drug screening programs and may effect your ability to participate in federally funded programs  · [facility] does not allow for medical marijuana possession or use at any of it's inpatient facilities  If it is brought it you will be asked to send it home with a designated representative (friend or family member)    If not one is available to take the product(s) home they will be stored in a secure location until you are discharged  · Please spend time becoming familiar with the information on the website before your visit: [employer]ee[employer] cz    Date of scheduled visit: 01/14/22   Dr Donny Desai spouse may need to also register as Caretaker to accompany pt into dispensary

## 2022-01-10 ENCOUNTER — TELEPHONE (OUTPATIENT)
Dept: PALLIATIVE MEDICINE | Facility: CLINIC | Age: 77
End: 2022-01-10

## 2022-01-10 NOTE — TELEPHONE ENCOUNTER
----- Message from Mai Santos MA sent at 1/5/2022  1:49 PM EST -----  Regarding: MMJ SCREENING  Medical Marijuana Pre-Visit Screening for Palliative & Supportive Care    Referral Source: IKER Morales  Diagnosis: Progressive supranuclear palsy   Is the diagnosis an approved serious medical condition as outlined by [place] 16: Yes  History/Symptoms: Shakiness     Does the patient's diagnosis fall within the current scope of our Palliative & Supportive Care practice: Yes   Does the patient intend to use MMJ with palliative intent: Yes  Does the patient currently have a signed controlled substances contract with another provider: No  Is the patient a resident of [place] with a valid state ID or 's license: Yes  Has the patient registered on the [address] [place] [address]  website: No, but spouse has been given the information  [email or web address]     Prior to any scheduled visit, the patient has been informed of the following:     "Our Palliative & Supportive Care providers are knowledgeable about many ways to help people   A visit to discuss MMJ does not mean that the provider agrees that this is the best way to help you and may make other recommendations  There is an expectation and requirement by the [place] MMJ law for continuity of care if your certification is completed  You will be expected to sign an informed consent     A PDMP report will be reviewed before your visit  This may effect your ability to purchase a handgun  Medical marijuana products are not covered by insurance   The dispensaries do not accept credit cards  The certification does not exempt you from any employer based drug screening programs and may effect your ability to participate in federally funded programs  [facility] does not allow for medical marijuana possession or use at any of it's inpatient facilities    If it is brought it you will be asked to send it home with a designated representative (friend or family member)  If not one is available to take the product(s) home they will be stored in a secure location until you are discharged "  Please spend time becoming familiar with the information on the website before your visit: FeeTkostavision bettina    Date of scheduled visit: Await call back upon registration  A visit was not scheduled for this patient because: Pt has not registered at the moment, but will return call as soon as this is complete

## 2022-01-17 ENCOUNTER — TELEPHONE (OUTPATIENT)
Dept: NEUROLOGY | Facility: CLINIC | Age: 77
End: 2022-01-17

## 2022-01-17 NOTE — TELEPHONE ENCOUNTER
wife calling to update  300mg gabapentin did not change sleep pattern  been taking this for over 2 weeks with no results  propranolol BID also shows no change in tremors/shaking  asking for alternative recommendations

## 2022-01-17 NOTE — TELEPHONE ENCOUNTER
Spoke to wife  Informed of previous  Verbalized understanding  Will notify office if anything further is needed

## 2022-01-17 NOTE — TELEPHONE ENCOUNTER
If his blood pressure is tolerating, he can increase the propanolol to 20 mg BID and should continue to further monitor BP  He can also try to increase the gabapentin to 600 mg at bedtime  If no improvement with either of these doses or has hypotension or increased drowsiness or balance issues should let us know

## 2022-02-09 ENCOUNTER — TELEPHONE (OUTPATIENT)
Dept: PALLIATIVE MEDICINE | Facility: CLINIC | Age: 77
End: 2022-02-09

## 2022-02-09 NOTE — TELEPHONE ENCOUNTER
Wife Frida Stanley calling to schedule MMJ patient is registered a while ago but she is having a hard time getting back in  Dr Kem Vera can you go on web site and confirm he is still registered and it dont void after a while  He has been given an appt for [facility] with you

## 2022-03-01 ENCOUNTER — OFFICE VISIT (OUTPATIENT)
Dept: PALLIATIVE MEDICINE | Facility: CLINIC | Age: 77
End: 2022-03-01
Payer: MEDICARE

## 2022-03-01 VITALS
WEIGHT: 231.92 LBS | HEART RATE: 59 BPM | SYSTOLIC BLOOD PRESSURE: 140 MMHG | OXYGEN SATURATION: 96 % | TEMPERATURE: 98.6 F | BODY MASS INDEX: 33.28 KG/M2 | RESPIRATION RATE: 16 BRPM | DIASTOLIC BLOOD PRESSURE: 80 MMHG

## 2022-03-01 DIAGNOSIS — G23.1 PROGRESSIVE SUPRANUCLEAR PALSY (HCC): Primary | ICD-10-CM

## 2022-03-01 DIAGNOSIS — R25.9 MIXED ACTION AND RESTING TREMOR: ICD-10-CM

## 2022-03-01 PROCEDURE — 99204 OFFICE O/P NEW MOD 45 MIN: CPT | Performed by: INTERNAL MEDICINE

## 2022-03-01 NOTE — PROGRESS NOTES
Palliative and Supportive Care   Mikayla Apodaca 78 y o  male [id number]    Assessment/Plan:  1  Progressive supranuclear palsy ([address] )    2  Mixed action and resting tremor    Insomnia  Palliative Care patient  Medical MArijuana use  Advanced care planning  Requested Prescriptions      No prescriptions requested or ordered in this encounter     There are no discontinued medications  Has a living will written  Has appropriate assistive devices  MMJ as below  Will follow with [facility] as needed or in 1 year for recert  The patient qualifies for use of MMJ in the state [facility] by having the following medical condition - progressive supranuclear palsy  From a palliative care stand point the patient is suffering with tremors, insomnia, irritability  These symptoms and side effects might be alleviated with use of MMJ products  The patient registered online  The patient read and I reviewed the informed consent document with the patient  I answered all questions related to it before the patient signed it  The patient's medical certification was completed on this date  The patient was given a signed copy of the informed consent and medical certification  Representatives have queried the patient's controlled substance dispensing history in the Prescription Drug Monitoring Program in compliance with regulations before I have prescribed any controlled substances  The prescription history is consistent with prescribed therapy and our practice policies  50+ minutes (09:05-9:55) were spent face to face with Mikayla Apodaca and his spouse with greater than 50% of the time spent in counseling or coordination of care including discussions of treatment instructions   All of the patient's questions were answered during this discussion  No follow-ups on file      Subjective:   Chief Complaint  New consultation for:  symptom management  HPI     Mikayla Apodaca is a 78 y o  male with history of progressive supranuclear palsy followed by Dr Marly Larios in Neurology as well as neurology at [facility] 2nd opinion who presents to establish with [facility]  Given his functional decline and symptom burden, patient referred to discuss ACP and consideration for [facility]  Patient and spouse provide history together  Unfortunately Betty Finch has had a slow decline over the past several years  Despite the interventions of the above teams he continues to struggle with his symptom burden  Specifically his tremor and rigidity have been quite problematic  He no longer drives  Requires assistance to stand up  Has assistive devices but does not like to use them  Does have issues choking with eating solid foods  No CP  No SOB  Good appetite  Not losing any weight  No constipation  Does have issues with insomnia  Wife also reports he can be irritable  The following portions of the medical history were reviewed: past medical history, problem list, medication list, and social history  Current Outpatient Medications:     amLODIPine (NORVASC) 5 mg tablet, Take 1 tablet (5 mg total) by mouth daily, Disp: 90 tablet, Rfl: 3    Cholecalciferol (D3 ADULT PO), Take by mouth, Disp: , Rfl:     hydrochlorothiazide (HYDRODIURIL) 12 5 mg tablet, Take 1 tablet by mouth once daily, Disp: 90 tablet, Rfl: 3    Levothyroxine Sodium 88 MCG CAPS, Take by mouth daily, Disp: , Rfl:     vitamin B-12 (VITAMIN B-12) 1,000 mcg tablet, Take by mouth daily, Disp: , Rfl:     donepezil (ARICEPT ODT) 10 MG disintegrating tablet, Take 1 tablet (10 mg total) by mouth daily at bedtime, Disp: 30 tablet, Rfl: 8    gabapentin (NEURONTIN) 100 mg capsule, Take 1 capsule (100 mg total) by mouth daily at bedtime (Patient not taking: Reported on 3/1/2022 ), Disp: 30 capsule, Rfl: 5    propranolol (INDERAL) 10 mg tablet, Take 1 tablet (10 mg total) by mouth 2 (two) times a day, Disp: 60 tablet, Rfl: 2  Review of Systems   Constitutional: Positive for activity change   Negative for appetite change and unexpected weight change  Respiratory: Negative for shortness of breath  Cardiovascular: Negative for chest pain  Gastrointestinal: Negative for constipation, diarrhea and nausea  Musculoskeletal: Positive for gait problem  Negative for back pain  Neurological: Positive for tremors and speech difficulty  Negative for dizziness  Psychiatric/Behavioral: Positive for agitation and sleep disturbance  Negative for dysphoric mood and hallucinations  All other systems reviewed and are negative  All other systems negative    Objective:  Vital Signs  /80 (BP Location: Left arm, Cuff Size: Standard)   Pulse 59   Temp 98 6 °F (37 °C) (Temporal)   Resp 16   Wt 105 kg (231 lb 14 8 oz)   SpO2 96%   BMI 33 28 kg/m²    Physical Exam    Constitutional: Appears well-developed and well-nourished  In no acute distress  Head: Normocephalic and atraumatic  Eyes: EOM are normal  Right eye exhibits no discharge  Left eye exhibits no discharge  No scleral icterus  Pulmonary/Chest: Effort normal  No stridor  No respiratory distress  Abdominal: No distension  Musculoskeletal: No edema  Neurological: Alert, oriented and appropriately conversant  Baseline resting tremor  Speech is slow but coherent  Skin: Skin is dry, not diaphoretic  Psychiatric: Displays a normal mood and affect  Behavior, judgement and thought content appear normal    Vitals reviewed

## 2022-04-29 ENCOUNTER — TELEPHONE (OUTPATIENT)
Dept: NEUROLOGY | Facility: CLINIC | Age: 77
End: 2022-04-29

## 2022-04-29 NOTE — TELEPHONE ENCOUNTER
Called pt and spoke to Shayan's wife in regards to r/s appt as the provider will not be in the office on this day so I am offering an appt on May 9th at 7:30 AM  Patient agreed to this appt and is now scheduled

## 2022-05-09 ENCOUNTER — OFFICE VISIT (OUTPATIENT)
Dept: NEUROLOGY | Facility: CLINIC | Age: 77
End: 2022-05-09
Payer: MEDICARE

## 2022-05-09 VITALS
DIASTOLIC BLOOD PRESSURE: 78 MMHG | TEMPERATURE: 97.8 F | SYSTOLIC BLOOD PRESSURE: 138 MMHG | HEART RATE: 86 BPM | BODY MASS INDEX: 33.58 KG/M2 | WEIGHT: 234 LBS

## 2022-05-09 DIAGNOSIS — G23.1 PROGRESSIVE SUPRANUCLEAR PALSY (HCC): Primary | ICD-10-CM

## 2022-05-09 PROCEDURE — 99215 OFFICE O/P EST HI 40 MIN: CPT | Performed by: PSYCHIATRY & NEUROLOGY

## 2022-05-09 NOTE — ASSESSMENT & PLAN NOTE
Slowly progressive symptoms of postural stability with parkinsonism and supranuclear palsy consistent with his diagnosis  Later development of rest greater than action tremors of the hands  Propanolol was ineffective for hand tremors  On exam today appears tremors are mostly at rest and likely interfering with eating when utensils kept in certain postures or act performed more slowly  Previous trial of Sinemet performed at a time when tremor was not a symptom  It did not help with postural instability or bradykinesia at the time  We will retry to see if this will help dampen rest tremor  Instructed to take Sinemet 25/100 1 tab twice daily 6 hours apart  If tolerating then increase to 1 tab 3 times daily (q 6 hours apart)  If tolerating but tremor not controlled we could further increase to 1 5 po 3 times daily for a week then 2 po 3 times daily  They have the medication at home, and will call should he wish to continue any this refilled  Time spent discussing prognosis, progression and expectations when it comes to treatment

## 2022-05-09 NOTE — PROGRESS NOTES
Review of Systems   Constitutional: Negative  Negative for appetite change and fever  HENT: Negative  Negative for hearing loss, tinnitus, trouble swallowing and voice change  Eyes: Negative  Negative for photophobia and pain  Respiratory: Negative  Negative for shortness of breath  Cardiovascular: Negative  Negative for palpitations  Gastrointestinal: Negative  Negative for nausea and vomiting  Endocrine: Negative  Negative for cold intolerance  Genitourinary: Negative  Negative for dysuria, frequency and urgency  Musculoskeletal: Positive for gait problem  Negative for myalgias and neck pain  Balance Issues     Skin: Negative  Negative for rash  Allergic/Immunologic: Negative  Neurological: Positive for dizziness (A little bit when rolling to the Left side in bed), tremors (Hands and have gotten worse) and speech difficulty  Negative for seizures, syncope, facial asymmetry, weakness, light-headedness, numbness and headaches  Hematological: Negative  Does not bruise/bleed easily  Psychiatric/Behavioral: Positive for hallucinations and sleep disturbance  Negative for confusion  All other systems reviewed and are negative

## 2022-05-09 NOTE — PROGRESS NOTES
Patient ID: Lobo Arevalo is a 78 y o  male    Assessment/Plan:    Progressive supranuclear palsy ([address] )  Slowly progressive symptoms of postural stability with parkinsonism and supranuclear palsy consistent with his diagnosis  Later development of rest greater than action tremors of the hands  Propanolol was ineffective for hand tremors  On exam today appears tremors are mostly at rest and likely interfering with eating when utensils kept in certain postures or act performed more slowly  Previous trial of Sinemet performed at a time when tremor was not a symptom  It did not help with postural instability or bradykinesia at the time  We will retry to see if this will help dampen rest tremor  Instructed to take Sinemet 25/100 1 tab twice daily 6 hours apart  If tolerating then increase to 1 tab 3 times daily (q 6 hours apart)  If tolerating but tremor not controlled we could further increase to 1 5 po 3 times daily for a week then 2 po 3 times daily  They have the medication at home, and will call should he wish to continue any this refilled  Time spent discussing prognosis, progression and expectations when it comes to treatment  Diagnoses and all orders for this visit:    Progressive supranuclear palsy (HCC)  -     carbidopa-levodopa (Sinemet)  mg per tablet; 1 po 3 times daily, titrate as instructed    Spent 40 min minutes on patient visit, counseling, and documentation  Subjective: Antonio Martínez is a RH man with progressive supranuclear palsy who presents for movement follow up  To review, symptoms began in 2018 with falling   He was diagnosed by Dr Haley Chowdhury at [address] in Feb 2019 ater being noted to have restrictions in vertical eye movements along with changes on MRI consistent with PSP       Prior medications:  Sinemet - sedation  Sinemet CR - sedation and edema  Depakote for mood- ineffective  Amantadine- rash  Donepezil 10mg qhs - ineffective   Gabapentin for sleep and this was ineffective  Propranolol which did not help tremor  He tried OTC drops (unclear what it was) which only made him more dizzy so he stopped after 2 days  He was seen by palliative care and he started on THC drops under the tongue to see if this would help with tremor  It has not helped with tremor after being on tid dosing for 2-3 weeks  Currently he is having dizziness when he turns over in bed  He describes this as feeling lightheaded  He keeps his eye closed and this resolves within 30 seconds  No spinning of the room but he does not open his eyes  He does not experience this on arising from bed or chair  He had one fall on standing  He suddenly fell  No presyncopal symptoms  Rest tremor present in the right greater than left hand rest    It can interfere with eating if movement are slow  He uses left hand for liquids  He has trouble eating with left his hand  He coughs on liquids at time  No issues with food  Speech changed a bit but he is easily understood  Sleep is difficult  Sleeps well about every 3rd night  No vivid dreams  He has mild daytime sedation and will nap while watching television  He is having a 1957 car refinished so this keeps him busy and mind occupied  Cognition is unchanged  He remembers medications  He helps a little around the house but falls risk prevents him form doing too much             Objective:    /78 (BP Location: Left arm, Patient Position: Standing, Cuff Size: Standard)   Pulse 86   Temp 97 8 °F (36 6 °C)   Wt 106 kg (234 lb)   BMI 33 58 kg/m²       Physical Exam  Vitals reviewed  Neurological:      Mental Status: He is alert  Deep Tendon Reflexes: Strength normal          Neurological Exam  Mental Status  Alert  Oriented only to person, place and situation  Speech: Hypophonia  Language is fluent with no aphasia  Attention and concentration are normal     Cranial Nerves  CN II: Supranuclear palsy with restrictions in vertical movement  Full but interrupted saccades with horizontal movement  Slowed saccades  Reduced eye blink  CN III, IV, VI:   Right pupil: Pinpoint  Left pupil: Pinpoint  CN VII: Full and symmetric facial movement  CN VIII: Hearing is normal   CN IX, X: Palate elevates symmetrically  CN XI: Shoulder shrug strength is normal   CN XII: Tongue midline without atrophy or fasciculations  Motor   Increased muscle tone  Right upper extremity and neck  Strength is 5/5 throughout all four extremities  Sensory  Light touch is normal in upper and lower extremities  Coordination  Right: Finger-to-nose normal  Rapid alternating movement abnormality:  Left: Finger-to-nose normal  Rapid alternating movement abnormality:  Right greater than left moderate rest tremor  Slight intentional tremor on finger-to-nose  Moderate bradykinesia with decrement  Gait  Casual gait: Unable to rise from chair without using arms  Arose using his hands  Able to ambulate independently with reduced stride  Freezing or      MDS UPDRS III                              4/15/21 5/9/22   Time since last dose:      Speech  0 1   Facial Expression  2 3   Rigidity - Neck  1 2   Rigidity - Upper Extremity (Right)  1 2   Rigidity - Upper Extremity (Left)   0 1   Rigidity - Lower Extremity (Right)  0 0   Rigidity - Lower Extremity (Left)   0 0   Finger Taps (Right)   2 2   Finger Taps (Left)   2 3   Hand Movement (Right)  2 2   Hand Movement (Left)   2 2   Pronation/Supination (Right)  2 2   Pronation/Supination (Left)   2 2   Toe Tapping (Right) 1 2   Toe Tapping (Left) 1 2   Leg Agility (Right)  0 0   Leg Agility (Left)   0 0   Arising from Chair   2 2   Gait   1 2   Freezing of Gait 0 0   Postural Stability   1    Posture 2 slight right tilt 2   Global spontaneity of movement 2 2   Postural Tremor (Right) 0 0   Postural Tremor (Left) 0 0   Kinetic Tremor (Right)  0 1   Kinetic Tremor (Left)  0 1   Rest tremor amplitude RUE 0 3   Rest tremor amplitude LUE 0 2   Rest tremor amplitude RLE 0 0   Reset tremor amplitude LLE 0 0   Lip/Jaw Tremor  0 0   Consistency of tremor 0 0   Motor Exam Total:               Current Outpatient Medications on File Prior to Visit   Medication Sig Dispense Refill    amLODIPine (NORVASC) 5 mg tablet Take 1 tablet (5 mg total) by mouth daily 90 tablet 3    Cholecalciferol (D3 ADULT PO) Take by mouth      hydrochlorothiazide (HYDRODIURIL) 12 5 mg tablet Take 1 tablet by mouth once daily (Patient taking differently: every other day  ) 90 tablet 3    Levothyroxine Sodium 88 MCG CAPS Take by mouth daily      vitamin B-12 (VITAMIN B-12) 1,000 mcg tablet Take by mouth daily      [DISCONTINUED] amantadine (SYMMETREL) 100 mg capsule 1 tab po qam x 1 week then 1 tab am and afternoon 60 capsule 4    [DISCONTINUED] donepezil (ARICEPT ODT) 10 MG disintegrating tablet Take 1 tablet (10 mg total) by mouth daily at bedtime 30 tablet 8    [DISCONTINUED] gabapentin (NEURONTIN) 100 mg capsule Take 1 capsule (100 mg total) by mouth daily at bedtime (Patient not taking: Reported on 3/1/2022 ) 30 capsule 5    [DISCONTINUED] propranolol (INDERAL) 10 mg tablet Take 1 tablet (10 mg total) by mouth 2 (two) times a day 60 tablet 2     No current facility-administered medications on file prior to visit           Blaire Perez MD  Movement disorder physician  [address]

## 2022-05-09 NOTE — PATIENT INSTRUCTIONS
Previous trial of Sinemet performed at a time when tremor was not a symptoms  We will retry to see if this will help control tremor  Take Sinemet 25/100 1 tab twice daily 6 hours apart  If tolerating then increase to 1 tab 3 times daily (q 6 hours apart)  If tolerating but tremor not controlled we could further increase to 1 5 po 3 times daily for a week then 2 po 3 times daily  Call if you need medication called in

## 2022-12-01 ENCOUNTER — TELEPHONE (OUTPATIENT)
Dept: NEUROLOGY | Facility: CLINIC | Age: 77
End: 2022-12-01

## 2022-12-01 NOTE — TELEPHONE ENCOUNTER
Patients wife called in regards to scheduling an appointment with Dr Karoline Tyson  I offered the patient 6/7/2023 at 9:30 am  Patient was upset he had to wait that long to be seen for a follow up and did not schedule

## 2022-12-12 ENCOUNTER — OFFICE VISIT (OUTPATIENT)
Dept: NEUROLOGY | Facility: CLINIC | Age: 77
End: 2022-12-12

## 2022-12-12 VITALS
BODY MASS INDEX: 33.15 KG/M2 | DIASTOLIC BLOOD PRESSURE: 88 MMHG | TEMPERATURE: 97.8 F | HEART RATE: 67 BPM | SYSTOLIC BLOOD PRESSURE: 137 MMHG | WEIGHT: 231 LBS

## 2022-12-12 DIAGNOSIS — R25.9 MIXED ACTION AND RESTING TREMOR: ICD-10-CM

## 2022-12-12 DIAGNOSIS — G23.1 PROGRESSIVE SUPRANUCLEAR PALSY (HCC): Primary | ICD-10-CM

## 2022-12-12 DIAGNOSIS — M54.50 LOWER BACK PAIN: ICD-10-CM

## 2022-12-12 DIAGNOSIS — G47.00 INSOMNIA: ICD-10-CM

## 2022-12-12 NOTE — PROGRESS NOTES
Review of Systems   Constitutional: Negative  Negative for appetite change and fever  HENT: Positive for trouble swallowing and voice change  Negative for hearing loss and tinnitus  Eyes: Negative  Negative for photophobia, pain and visual disturbance  Respiratory: Negative  Negative for shortness of breath  Cardiovascular: Negative  Negative for palpitations  Gastrointestinal: Negative  Negative for nausea and vomiting  Endocrine: Negative  Negative for cold intolerance  Genitourinary: Negative  Negative for dysuria, frequency and urgency  Musculoskeletal: Positive for back pain and gait problem (Freezing issues and then takes a while to turn around due to the freezing issues)  Negative for myalgias and neck pain  Had a fall yesterday, fell backwards     Skin: Negative  Negative for rash  Allergic/Immunologic: Negative  Neurological: Positive for tremors (Right Hand mostly and a little bit in Left hand  , has gotten worse) and weakness (Cannot sit or stand on his own and cannot stand too long)  Negative for dizziness, seizures, syncope, facial asymmetry, speech difficulty, light-headedness, numbness and headaches  Hematological: Negative  Does not bruise/bleed easily  Psychiatric/Behavioral: Positive for sleep disturbance (Gets up at least twice a night)  Negative for confusion and hallucinations  All other systems reviewed and are negative

## 2022-12-12 NOTE — PROGRESS NOTES
Patient ID: Lucas Hatch is a 78 y o  male    Assessment/Plan:    Progressive supranuclear palsy ([facility] [address] )  Progressive Parkinson's symptoms with patient now having great difficulty with movement, requiring assistance with all tasks including eating, and having much difficulty with transfers and ambulating requiring assistance  He is at great risk for falls spending time in a wheelchair  There are greater restrictions and eye movement  Time spent reviewing history and chart  He has tried and failed multiple medications for tremor  Tremor continues to worsen and interfere with daily activities  He has an understanding of prognosis  We discussed the goal of trying to better control symptoms to improve quality of life  I do think she would benefit from home occupational and physical therapy  At this point his wife has helping him with all ADLs  Home PT may assist with improving strength and conditioning so that he can better assist with task  OT to help with strategies to improve ADLs and any suggestions on adaptive equipment     They are agreeable  He is having significant difficulty with sleep as well  There have been smaller trials with no clear evidence suggesting possible use of zolpidem or amitriptyline for sleep and oculomotor or locomotion  Will try zolpidem 5mg qhs  If ineffective but tolerating, can try further increase to 10mg qhs  If no effect will discontinue and try amitrityline  Diagnoses and all orders for this visit:    Progressive supranuclear palsy [facility])  -     Referral to [address] [place] [address]; Future    Lower back pain  -     Referral to [address] [place] [address]; Future    Mixed action and resting tremor    Insomnia  -     zolpidem (AMBIEN) 5 mg tablet; Take 1 tablet (5 mg total) by mouth daily at bedtime as needed for sleep    Spent over 50 minutes on patient visit, review of chart and history, counseling, and documentation  Subjective:       Freya Lehman is a RH man with progressive supranuclear palsy who presents for movement follow up  To review, symptoms began in 2018 with falling  He was diagnosed by Dr Winston Roth at [address] [facility] [address] in Feb 2019 ater being noted to have restrictions in vertical eye movements along with changes on MRI consistent with PSP       Prior medications:  Sinemet - sedation  Sinemet CR - sedation and edema  Depakote for mood- ineffective  Amantadine- rash  Donepezil 10mg qhs - ineffective   Gabapentin for sleep and this was ineffective  Propranolol which did not help tremor  Sublingual THC- did not help tremor    Sinemet retrialed to see if this may improve tremor  It is unclear if he increase completely to 2 tablets 3 times daily  He continues to have right rater than left hand tremor  He uses his right hand to eat  Gait complicated by freezing  He has difficulty standing for prolonged periods without assistance  He is now in a wheelchair most of the day  He had a fall backwards yesterday  Back is sore  He has dysphagia  This is worse with liquids and dry food  Tremor interferes with eating so he is fed by wife  He drink s out of cup with lid  Left hand for finger food  He needs more assistance  Need helps with all activities including turning in bed  Speech is worse  He has trouble thinking of the right words  He denies any other memory issues  Sleep is disturbed  He arises least twice nightly  He sleeps only 2 5 hours a night  It is effortful to try to get him to the bathroom at night for urination as he needs much assistance      Objective:    /88 (BP Location: Left arm, Patient Position: Sitting, Cuff Size: Standard)   Pulse 67   Temp 97 8 °F (36 6 °C)   Wt 105 kg (231 lb)   BMI 33 15 kg/m²       Physical Exam  Vitals reviewed  Eyes:      Pupils: Pupils are equal, round, and reactive to light  Neurological:      Mental Status: He is alert        Motor: Motor strength is normal          Neurological Exam  Mental Status  Alert  Oriented only to person, place and situation  Speech: Moderate hypophonia  Language is fluent with no aphasia  Attention and concentration are normal     Cranial Nerves  CN II: Supranuclear palsy with restrictions in vertical and horizontal gaze more apparent when looking right  Hypometric saccades  Diminished smooth pursuit  Frontalis activation with elevation of eyelids leading to stare  Procerus sign  CN III, IV, VI: Pupils equal round and reactive to light bilaterally  Tendency to close and eyelid independently at a time for a few seconds  This occurred on left and right     CN VII: Full and symmetric facial movement  CN VIII: Hearing is normal   CN XI: Shoulder shrug strength is normal   CN XII: Tongue midline without atrophy or fasciculations  Motor   Increased muscle tone  Strength is 5/5 throughout all four extremities  Sensory  Light touch is normal in upper and lower extremities  Coordination  Right: Rapid alternating movement abnormality:Left: Rapid alternating movement abnormality:  See motor UPDRS    Mild kinetic tremors  Almost constant rest tremors of the upper extremities   Her amplitude in the right compared to left    Difficult reduction in amplitude of hand and leg movements with interruptions  Gait   Unable to rise from chair without using arms  Not ambulated today      MDS UPDRS III                              5/9/22    Time since last dose:      Speech  1 2   Facial Expression  3 3   Rigidity - Neck  2 4   Rigidity - Upper Extremity (Right)  2 2   Rigidity - Upper Extremity (Left)   1 2   Rigidity - Lower Extremity (Right)  0 2   Rigidity - Lower Extremity (Left)   0 2   Finger Taps (Right)   2 3   Finger Taps (Left)   3 3   Hand Movement (Right)  2 2   Hand Movement (Left)   2 2   Pronation/Supination (Right)  2 3   Pronation/Supination (Left)   2 3   Toe Tapping (Right) 2 3   Toe Tapping (Left) 2 3   Leg Agility (Right)  0 3   Leg Agility (Left)   0 3   Arising from Chair   2    Gait   2    Freezing of Gait 0    Postural Stability        Posture 2    Global spontaneity of movement 2 3   Postural Tremor (Right) 0 1   Postural Tremor (Left) 0 1   Kinetic Tremor (Right)  1 1   Kinetic Tremor (Left)  1 1   Rest tremor amplitude RUE 3 3   Rest tremor amplitude LUE 2 2   Rest tremor amplitude RLE 0 0   Reset tremor amplitude LLE 0 0   Lip/Jaw Tremor  0 0   Consistency of tremor 0 4   Motor Exam Total:                  Current Outpatient Medications on File Prior to Visit   Medication Sig Dispense Refill   • amLODIPine (NORVASC) 5 mg tablet Take 1 tablet (5 mg total) by mouth daily 90 tablet 3   • Cholecalciferol (D3 ADULT PO) Take by mouth every other day     • hydrochlorothiazide (HYDRODIURIL) 12 5 mg tablet Take 1 tablet by mouth once daily (Patient taking differently: every other day) 90 tablet 3   • Levothyroxine Sodium 88 MCG CAPS Take by mouth daily     • vitamin B-12 (VITAMIN B-12) 1,000 mcg tablet Take by mouth every other day     • [DISCONTINUED] amantadine (SYMMETREL) 100 mg capsule 1 tab po qam x 1 week then 1 tab am and afternoon 60 capsule 4   • [DISCONTINUED] carbidopa-levodopa (Sinemet)  mg per tablet 1 po 3 times daily, titrate as instructed 90 tablet 5   • [DISCONTINUED] donepezil (ARICEPT ODT) 10 MG disintegrating tablet Take 1 tablet (10 mg total) by mouth daily at bedtime 30 tablet 8   • [DISCONTINUED] gabapentin (NEURONTIN) 100 mg capsule Take 1 capsule (100 mg total) by mouth daily at bedtime (Patient not taking: Reported on 3/1/2022 ) 30 capsule 5   • [DISCONTINUED] propranolol (INDERAL) 10 mg tablet Take 1 tablet (10 mg total) by mouth 2 (two) times a day 60 tablet 2     No current facility-administered medications on file prior to visit           Macie Watt MD  Movement disorder physician  [facility] [place]

## 2022-12-13 RX ORDER — ZOLPIDEM TARTRATE 5 MG/1
5 TABLET ORAL
Qty: 30 TABLET | Refills: 1 | Status: SHIPPED | OUTPATIENT
Start: 2022-12-13

## 2022-12-13 NOTE — ASSESSMENT & PLAN NOTE
Progressive Parkinson's symptoms with patient now having great difficulty with movement, requiring assistance with all tasks including eating, and having much difficulty with transfers and ambulating requiring assistance  He is at great risk for falls spending time in a wheelchair  There are greater restrictions and eye movement  Time spent reviewing history and chart  He has tried and failed multiple medications for tremor  Tremor continues to worsen and interfere with daily activities  He has an understanding of prognosis  We discussed the goal of trying to better control symptoms to improve quality of life  I do think she would benefit from home occupational and physical therapy  At this point his wife has helping him with all ADLs  Home PT may assist with improving strength and conditioning so that he can better assist with task  OT to help with strategies to improve ADLs and any suggestions on adaptive equipment     They are agreeable  He is having significant difficulty with sleep as well  There have been smaller trials with no clear evidence suggesting possible use of zolpidem or amitriptyline for sleep and oculomotor or locomotion  Will try zolpidem 5mg qhs  If ineffective but tolerating, can try further increase to 10mg qhs  If no effect will discontinue and try amitrityline

## 2022-12-27 ENCOUNTER — TELEPHONE (OUTPATIENT)
Dept: NEUROLOGY | Facility: CLINIC | Age: 77
End: 2022-12-27

## 2022-12-27 DIAGNOSIS — G47.01 INSOMNIA DUE TO MEDICAL CONDITION: ICD-10-CM

## 2022-12-27 DIAGNOSIS — R45.86 MOOD SWINGS: ICD-10-CM

## 2022-12-27 DIAGNOSIS — G23.1 PROGRESSIVE SUPRANUCLEAR PALSY (HCC): Primary | ICD-10-CM

## 2022-12-27 RX ORDER — AMITRIPTYLINE HYDROCHLORIDE 10 MG/1
10 TABLET, FILM COATED ORAL
Qty: 30 TABLET | Refills: 5 | Status: SHIPPED | OUTPATIENT
Start: 2022-12-27 | End: 2023-01-10 | Stop reason: DRUGHIGH

## 2022-12-27 NOTE — TELEPHONE ENCOUNTER
This is Samuel Castillo calling for my  Nelli Cordova  His date of birth is [birth year]  He's a patient of Dr Renée Fischer  And she is tried him on [place] generic for some sleep  It doesn't seem to be working  She gave him 5 milligram  I increased it,  gave him 2 at night for 10 milligrams and he still up about every 2 and a half hours  She did discuss that our visit on December 12th, that she could try amitriptyline if the Ambien was not working, I would like her to order that at [facility] in [place] where she ordered the other one  I don't know what milligrams she would be ordering, but the 10 milligram of ambien doesn't seem to be working  So I don't know she'll go higher than that  Again, my name is Licha Abdalla, and my number is [phone]  And I would like to get a phone call back about the prescription  Thank you

## 2022-12-27 NOTE — TELEPHONE ENCOUNTER
Called [phone], spoke w/ Lynette Butler and made aware of the below   She verbalized understanding

## 2023-01-04 ENCOUNTER — TELEPHONE (OUTPATIENT)
Dept: NEUROLOGY | Facility: CLINIC | Age: 78
End: 2023-01-04

## 2023-01-04 NOTE — TELEPHONE ENCOUNTER
Pt's wife left message  We wanted to talk to someone to Dr Josette Ching about applying for a hospital bed and he has been turned down  I don't know if there's anything she can do to get him a hospital bed because he falls all the time  He can't walk  And they're telling us that it needs to be a breathing problem  COPD, or a heart problem in order to qualify for a bed  So I don't know if there's anything that doctor Josette Ching can do on his behalf  And also, it doesn't seem like the medication Amitriptyline 10 mg  Is helping with his sleep  She has tried two different things,it was mentioned that Trazodone might be an option,or increasing the Amitriptyline   So if someone could call me back, I would very much appreciate it     #[phone]    Please advise

## 2023-01-10 DIAGNOSIS — G47.01 INSOMNIA DUE TO MEDICAL CONDITION: Primary | ICD-10-CM

## 2023-01-10 RX ORDER — AMITRIPTYLINE HYDROCHLORIDE 25 MG/1
25 TABLET, FILM COATED ORAL
Qty: 30 TABLET | Refills: 5 | Status: SHIPPED | OUTPATIENT
Start: 2023-01-10 | End: 2023-07-14 | Stop reason: SDUPTHER

## 2023-01-10 NOTE — TELEPHONE ENCOUNTER
Cassie Medley MD  Neurology [facility] Clinical Team 6; Neurology  1 hour ago (7:07 AM)       W can try increasing amitriptyline to 25mg nightly to see if this helps with sleep  Script sent to [id number]- any input on anything we can assist with when I comes to the hospital bed?

## 2023-01-10 NOTE — TELEPHONE ENCOUNTER
Called pt's wife Robert Curry and advised regarding below recommendation from dr Clifford,she verbalized understanding

## 2023-01-12 NOTE — TELEPHONE ENCOUNTER
Dr Huan Westbrook,     Medicare will only pay only for a semi-electrical hospital bed not all fully electric bed  There are documents needed to be provided to DME company in order for insurance to pay for a hospital bed  1-Rx written in WOPD Format  2-chart notes     Medical Necessity Coverage  (one or more of the following is required)     1  The beneficiary has a medical condition which requires positioning of the body in ways not feasible with an ordinary bed  Elevation of the head/upper body less than 30 degrees does not usually require the use of a hospital bed or  2  The beneficiary requires positioning of the body in ways not feasible with an ordinary bed in order to alleviate pain, or  3  The beneficiary requires the head of the bed to be elevated more than 30 degrees most of the time due to congestive heart failure, chronic pulmonary disease, or problems with aspiration, or  4  The beneficiary requires traction equipment, which can only be attached to a hospital bed  Physician must add the following statement to the chart notes otherwise patient will only qualify for a fixed height hospital bed and not a semi-electrical style bed  “patient require frequent changes in body position and/or has an immediate need for a change in body position”    Additionally, CHRISTI contacted [employer] at [phone] and spoke with Porsha Carmona who informed that if insurance company approves semi electrical hospital bed patient has the option to upgrade for $25 00 a month for 13 months (this is the rental time)  If after 13 months the patient continues to need a hospital bed, then the insurance will purchase it  If patient changes insurances then everything will have to be resubmitted to the new insurance and this is a new process  Pt has a f2f appointment with you in May 2023  Do you have any openings to see this patient sooner?  Alternative option includes for patient to be evaluated by PCP (will probably be able to offer a sooner appt ) and they can complete the documentation for a hospital bed       Please advise and thank you

## 2023-01-18 ENCOUNTER — TELEPHONE (OUTPATIENT)
Dept: NEUROLOGY | Facility: CLINIC | Age: 78
End: 2023-01-18

## 2023-01-18 NOTE — TELEPHONE ENCOUNTER
MSW phoned Jaren Orellana (spouse) who informed that she is currently working with PCP to obtain hospital bed, they already sent all paperwork to [employer] [place] [employer] [place]  She is awaiting on a determination from insurance for a semi electrical hospital bed

## 2023-01-18 NOTE — TELEPHONE ENCOUNTER
MSW phoned [address] [place] [address] at [phone] and they informed that they do not have the referral in the system  MSW phoned pt's wife who informed that PCP told them that they will be faxing all documents to [address] [place] [address], this was yesterday  Pt already had F2F for hospital bed with pcp    Pt's wife informed that she will call neuro for any questions or if she needs assistance in the future  This writer remains available

## 2023-01-18 NOTE — TELEPHONE ENCOUNTER
Called pt and spoke to Joey Sparks in regards to scheduling an appt with Jim Bennett as there are no sooner appts at this time with Dr Daniela Sparks stated they don't need to schedule an appt at this time and they are handling with PCP for the time being and will keep appt with Dr Daniela Saldana

## 2023-03-14 ENCOUNTER — TELEPHONE (OUTPATIENT)
Dept: NEUROLOGY | Facility: CLINIC | Age: 78
End: 2023-03-14

## 2023-03-14 NOTE — TELEPHONE ENCOUNTER
Wife left voicemail with question about face creams  States patients face is just "flaking off skin" asking if there are any moisturizers we would recommend  Call returned to wife, [phone]  Advised to contact pcp regarding skin issue

## 2023-05-22 ENCOUNTER — OFFICE VISIT (OUTPATIENT)
Dept: NEUROLOGY | Facility: CLINIC | Age: 78
End: 2023-05-22

## 2023-05-22 VITALS — TEMPERATURE: 98.1 F | HEART RATE: 60 BPM | DIASTOLIC BLOOD PRESSURE: 70 MMHG | SYSTOLIC BLOOD PRESSURE: 138 MMHG

## 2023-05-22 DIAGNOSIS — R13.10 DYSPHAGIA: ICD-10-CM

## 2023-05-22 DIAGNOSIS — G23.1 PROGRESSIVE SUPRANUCLEAR PALSY (HCC): Primary | ICD-10-CM

## 2023-05-22 NOTE — ASSESSMENT & PLAN NOTE
Advancement of symptoms with patient now being unable to ambulate requiring assistance with all activities of daily living including eating  They have noted an increase in coughing and choking  He has increased phlegm  Last swallow study was in 2021  Food is cut up small and liquids are thickened  Will schedule for repeat video swallow evaluation with speech sure he is on the proper diet  He question whether he needed an x-ray because of the coughing  He has an upcoming visit with his PCP and will address then  He is having no active fever or symptoms of illness  He has a good understanding of his prognosis and and has noted advancing symptoms  He understands the goal of trying to better control symptoms to improve quality of life  Discussed the potential benefit of home occupational physical therapy but patient is wife are hesitant as they do not feel that it will provide much benefit at this point in his condition  We discussed goals of seeing if home PT/OT may improve with strength and conditioning that he may better assist with ADLs  Contact the office prior to follow-up should they change their mind  He was having difficulty with sleep maintenance which has greatly improved on Amitriptyline therefore will continue  Zolpidem was ineffective  Questions regards to prognosis were answered to my best ability  I did discuss the potential benefits of meeting with palliative care once again with the goals of reviewing care planning and documentation of patient wishes  They could also assist with ideas on symptom management  They were agreeable and referral was made

## 2023-05-22 NOTE — PATIENT INSTRUCTIONS
Will refer for video swallow evaluation  Contact office if you change mind regarding home PT/OT  Continue amitriptyline

## 2023-05-22 NOTE — PROGRESS NOTES
Patient ID: Cecilio Carter is a 78 y o  male    Assessment/Plan:    Progressive supranuclear palsy ([address] )  Advancement of symptoms with patient now being unable to ambulate requiring assistance with all activities of daily living including eating  They have noted an increase in coughing and choking  He has increased phlegm  Last swallow study was in 2021  Food is cut up small and liquids are thickened  Will schedule for repeat video swallow evaluation with speech sure he is on the proper diet  He question whether he needed an x-ray because of the coughing  He has an upcoming visit with his PCP and will address then  He is having no active fever or symptoms of illness  He has a good understanding of his prognosis and and has noted advancing symptoms  He understands the goal of trying to better control symptoms to improve quality of life  Discussed the potential benefit of home occupational physical therapy but patient is wife are hesitant as they do not feel that it will provide much benefit at this point in his condition  We discussed goals of seeing if home PT/OT may improve with strength and conditioning that he may better assist with ADLs  Contact the office prior to follow-up should they change their mind  He was having difficulty with sleep maintenance which has greatly improved on Amitriptyline therefore will continue  Zolpidem was ineffective  Questions regards to prognosis were answered to my best ability  I did discuss the potential benefits of meeting with palliative care once again with the goals of reviewing care planning and documentation of patient wishes  They could also assist with ideas on symptom management  They were agreeable and referral was made  Diagnoses and all orders for this visit:    Progressive supranuclear palsy ([address] )  -     FL barium swallow video w speech; Future  -     Ambulatory Referral to Palliative Care;  Future    Dysphagia  -     FL barium swallow video w speech; Future    I have spent a total time of 40 minutes on 05/22/23 in caring for this patient including Prognosis, Patient and family education, Risk factor reductions, Impressions, Counseling / Coordination of care, Documenting in the medical record and Obtaining or reviewing history    Subjective: David Claudio is a RH man with progressive supranuclear palsy who presents for movement follow up  To review, symptoms began in 2018 with falling  He was diagnosed by Dr Erica Marinelli at [facility] in Feb 2019 ater being noted to have restrictions in vertical eye movements along with changes on MRI consistent with PSP       Prior medications:  Sinemet - sedation  Sinemet CR - sedation and edema  Depakote for mood- ineffective  Amantadine- rash  Donepezil 10mg qhs - ineffective   Gabapentin for sleep and this was ineffective  Propranolol which did not help tremor  Sublingual THC- did not help tremor    Last seen in December with sleep disturbance  There have been smaller trials with no clear evidence suggesting possible use of zolpidem or amitriptyline for sleep and oculomotor or locomotion  Started on zolpidem 5mg qhs with plans to increase to 10 mg if ineffective  Ineffective so he was switched to amitrityline  Amitriptyline 25 mg nightly has greatly improved sleep  He does report still having some difficulty initiating sleep  His wife does not note this  Speech is slurred  Balance continues to worsen which has now led to him being unable to ambulate  He is assisted with standing and transfers  He has difficulty with standing for any prolonged periods  He uses a wheelchair much of the day  Wife does assist with all activities of daily living including dressing, bathing, and even feeding him  He has resting hand tremors  R>L which interfere with his ability to feed himself  He has dysphagia with liquids and dry food  There is increased phlegm which causes coughing    He questions whether he needs an x-ray  He is not having any fever or feeling ill  He is occasionally lightheaded  Objective:    /70 (BP Location: Left arm, Patient Position: Sitting, Cuff Size: Standard)   Pulse 60   Temp 98 1 °F (36 7 °C)       Physical Exam  Vitals reviewed  Neurological:      Mental Status: He is alert  Motor: Motor strength is normal          Neurological Exam  Mental Status  Alert  Oriented only to person, place and situation  Speech: hypophonia mild dysarthria    Language is fluent with no aphasia  Attention and concentration are normal     Cranial Nerves  CN II: Particular palsy with restrictions in vertical gaze and reduced horizontal gaze  CN III, IV, VI:   Right pupil: 1 mm  Left pupil: 1 mm  CN VII: Full and symmetric facial movement  CN VIII: Hearing is normal   CN IX, X: Palate elevates symmetrically  CN XI: Shoulder shrug strength is normal   CN XII: Tongue midline without atrophy or fasciculations  Moderate hypomimia with stare       Motor   Increased muscle tone  Strength is 5/5 throughout all four extremities  Sensory  Light touch is normal in upper and lower extremities  Reflexes  Glabellar tap present  Coordination    Moderate amplitude rest tremors of the upper extremities present throughout much of the exam   Milder postural t and action tremors  No tremor of the legs  Moderate bradykinesia with decrement in interruptions on hand movements and heel taps       Gait    No longer ambulates          MDS UPDRS III                              5/22/23   Time since last dose:    Speech  2   Facial Expression  3   Rigidity - Neck  4   Rigidity - Upper Extremity (Right)  2   Rigidity - Upper Extremity (Left)   2   Rigidity - Lower Extremity (Right)  2   Rigidity - Lower Extremity (Left)   2   Finger Taps (Right)   3   Finger Taps (Left)   3   Hand Movement (Right)  2   Hand Movement (Left)   2   Pronation/Supination (Right)  3   Pronation/Supination (Left)   3   Toe Tapping (Right) 3   Toe Tapping (Left) 3   Leg Agility (Right)  3   Leg Agility (Left)   3   Arising from Chair      Gait      Freezing of Gait    Postural Stability      Posture    Global spontaneity of movement 3   Postural Tremor (Right) 1   Postural Tremor (Left) 2   Kinetic Tremor (Right)  1   Kinetic Tremor (Left)  1   Rest tremor amplitude RUE 3   Rest tremor amplitude LUE 2   Rest tremor amplitude RLE 0   Reset tremor amplitude LLE 0   Lip/Jaw Tremor  0   Consistency of tremor 4   Motor Exam Total:                   Rajesh Glasgow MD  Movement disorder physician  [address] [place] [address]

## 2023-06-01 ENCOUNTER — TELEPHONE (OUTPATIENT)
Dept: NEUROLOGY | Facility: CLINIC | Age: 78
End: 2023-06-01

## 2023-06-01 NOTE — TELEPHONE ENCOUNTER
Pt wife called to schedule his 4 month follow up per Dr Bo Vick  I offered her 11-27-23 at 9 am and added him to the wait list and she accepted

## 2023-07-14 DIAGNOSIS — G47.01 INSOMNIA DUE TO MEDICAL CONDITION: ICD-10-CM

## 2023-07-14 RX ORDER — AMITRIPTYLINE HYDROCHLORIDE 25 MG/1
TABLET, FILM COATED ORAL
Qty: 30 TABLET | Refills: 5 | Status: SHIPPED | OUTPATIENT
Start: 2023-07-14

## 2023-07-15 ENCOUNTER — OFFICE VISIT (OUTPATIENT)
Dept: URGENT CARE | Facility: CLINIC | Age: 78
End: 2023-07-15
Payer: MEDICARE

## 2023-07-15 VITALS
TEMPERATURE: 98.4 F | OXYGEN SATURATION: 95 % | RESPIRATION RATE: 18 BRPM | DIASTOLIC BLOOD PRESSURE: 77 MMHG | SYSTOLIC BLOOD PRESSURE: 128 MMHG | HEART RATE: 64 BPM

## 2023-07-15 DIAGNOSIS — L25.9 CONTACT DERMATITIS, UNSPECIFIED CONTACT DERMATITIS TYPE, UNSPECIFIED TRIGGER: Primary | ICD-10-CM

## 2023-07-15 PROCEDURE — G0463 HOSPITAL OUTPT CLINIC VISIT: HCPCS | Performed by: PHYSICIAN ASSISTANT

## 2023-07-15 PROCEDURE — 99213 OFFICE O/P EST LOW 20 MIN: CPT | Performed by: PHYSICIAN ASSISTANT

## 2023-07-15 RX ORDER — METHYLPREDNISOLONE 4 MG/1
TABLET ORAL
Qty: 1 EACH | Refills: 0 | Status: SHIPPED | OUTPATIENT
Start: 2023-07-15

## 2023-07-15 NOTE — PROGRESS NOTES
[facility]        NAME: Kumar Levy is a 70 y.o. male  : 1945    MRN: [id number]  DATE: July 15, 2023  TIME: 1:20 PM    Assessment and Plan   Contact dermatitis, unspecified contact dermatitis type, unspecified trigger [L25.9]  1. Contact dermatitis, unspecified contact dermatitis type, unspecified trigger  methylPREDNISolone 4 MG tablet therapy pack            Patient Instructions       Follow up with PCP in 3-5 days. Proceed to  ER if symptoms worsen. Chief Complaint     Chief Complaint   Patient presents with   • Poison Ivy     Started yesterday, arms abdomen, above left eye          History of Present Illness       Patient is a 71 y/o/m presenting [facility] with poison. Patient reports rash began yesterday. Rash is located to bilateral upper extremities, abdomen and above left eye. Patient wife evaluated yesterday for same. Patient has been taking Benadryl. Poison [facility]  This is a new problem. The current episode started yesterday. The problem has been gradually worsening since onset. The rash is diffuse. The rash is characterized by redness and itchiness. He was exposed to plant contact. Pertinent negatives include no cough, eye pain, fever, shortness of breath, sore throat or vomiting. Past treatments include antihistamine. The treatment provided no relief. Review of Systems   Review of Systems   Constitutional: Negative for chills and fever. HENT: Negative for ear pain and sore throat. Eyes: Negative for pain and visual disturbance. Respiratory: Negative for cough and shortness of breath. Cardiovascular: Negative for chest pain and palpitations. Gastrointestinal: Negative for abdominal pain and vomiting. Genitourinary: Negative for dysuria and hematuria. Musculoskeletal: Negative for arthralgias and back pain. Skin: Positive for rash. Negative for color change. Neurological: Negative for seizures and syncope.    All other systems reviewed and are negative. Current Medications       Current Outpatient Medications:   •  amitriptyline (ELAVIL) 25 mg tablet, take 1 tablet by mouth once daily at bedtime, Disp: 30 tablet, Rfl: 5  •  amLODIPine (NORVASC) 5 mg tablet, Take 1 tablet (5 mg total) by mouth daily, Disp: 90 tablet, Rfl: 3  •  Levothyroxine Sodium 88 MCG CAPS, Take by mouth daily, Disp: , Rfl:   •  methylPREDNISolone 4 MG tablet therapy pack, Use as directed on package, Disp: 1 each, Rfl: 0  •  Cholecalciferol (D3 ADULT PO), Take by mouth every other day (Patient not taking: Reported on 7/15/2023), Disp: , Rfl:   •  hydrochlorothiazide (HYDRODIURIL) 12.5 mg tablet, Take 1 tablet by mouth once daily (Patient taking differently: every other day), Disp: 90 tablet, Rfl: 3  •  vitamin B-12 (VITAMIN B-12) 1,000 mcg tablet, Take by mouth every other day (Patient not taking: Reported on 7/15/2023), Disp: , Rfl:     Current Allergies     Allergies as of 07/15/2023 - Reviewed 07/15/2023   Allergen Reaction Noted   • Other  2020   • Amantadine Itching and Rash 2021            The following portions of the patient's history were reviewed and updated as appropriate: allergies, current medications, past family history, past medical history, past social history, past surgical history and problem list.     Past Medical History:   Diagnosis Date   • History of echocardiogram 2018    Normal EF, Mild LVH   • Hypertension    • Hypothyroidism        History reviewed. No pertinent surgical history. Family History   Problem Relation Age of Onset   • Heart disease Mother          at age 59         Medications have been verified. Objective   /77 (BP Location: Left arm, Patient Position: Sitting)   Pulse 64   Temp 98.4 °F (36.9 °C)   Resp 18   SpO2 95%   No LMP for male patient. Physical Exam     Physical Exam  Constitutional:       Appearance: Normal appearance. He is normal weight.    HENT:      Head: Normocephalic and atraumatic. Nose: Nose normal.      Mouth/Throat:      Mouth: Mucous membranes are moist.   Eyes:      Extraocular Movements: Extraocular movements intact. Conjunctiva/sclera: Conjunctivae normal.      Pupils: Pupils are equal, round, and reactive to light. Cardiovascular:      Rate and Rhythm: Normal rate. Pulmonary:      Effort: Pulmonary effort is normal.   Musculoskeletal:         General: Normal range of motion. Cervical back: Normal range of motion and neck supple. Skin:     General: Skin is warm and dry. Neurological:      General: No focal deficit present. Mental Status: He is alert and oriented to person, place, and time.    Psychiatric:         Mood and Affect: Mood normal.         Behavior: Behavior normal.

## 2023-07-22 ENCOUNTER — OFFICE VISIT (OUTPATIENT)
Dept: URGENT CARE | Facility: CLINIC | Age: 78
End: 2023-07-22
Payer: MEDICARE

## 2023-07-22 VITALS
HEART RATE: 65 BPM | TEMPERATURE: 97.6 F | OXYGEN SATURATION: 96 % | DIASTOLIC BLOOD PRESSURE: 78 MMHG | RESPIRATION RATE: 18 BRPM | SYSTOLIC BLOOD PRESSURE: 142 MMHG

## 2023-07-22 DIAGNOSIS — L25.9 CONTACT DERMATITIS, UNSPECIFIED CONTACT DERMATITIS TYPE, UNSPECIFIED TRIGGER: Primary | ICD-10-CM

## 2023-07-22 PROCEDURE — 99213 OFFICE O/P EST LOW 20 MIN: CPT | Performed by: PHYSICIAN ASSISTANT

## 2023-07-22 PROCEDURE — 96372 THER/PROPH/DIAG INJ SC/IM: CPT | Performed by: PHYSICIAN ASSISTANT

## 2023-07-22 PROCEDURE — G0463 HOSPITAL OUTPT CLINIC VISIT: HCPCS | Performed by: PHYSICIAN ASSISTANT

## 2023-07-22 RX ORDER — PREDNISONE 10 MG/1
TABLET ORAL
Qty: 26 TABLET | Refills: 0 | Status: SHIPPED | OUTPATIENT
Start: 2023-07-22

## 2023-07-22 RX ORDER — DEXAMETHASONE SODIUM PHOSPHATE 10 MG/ML
10 INJECTION, SOLUTION INTRAMUSCULAR; INTRAVENOUS ONCE
Status: COMPLETED | OUTPATIENT
Start: 2023-07-22 | End: 2023-07-22

## 2023-07-22 RX ADMIN — DEXAMETHASONE SODIUM PHOSPHATE 10 MG: 10 INJECTION, SOLUTION INTRAMUSCULAR; INTRAVENOUS at 10:16

## 2023-07-22 NOTE — PROGRESS NOTES
[facility]    NAME: Donna Casanova is a 70 y.o. male  : 1945    MRN: [id number]  DATE: 2023  TIME: 11:17 AM    Assessment and Plan   Contact dermatitis, unspecified contact dermatitis type, unspecified trigger [L25.9]  1. Contact dermatitis, unspecified contact dermatitis type, unspecified trigger  dexamethasone (PF) (DECADRON) injection 10 mg    predniSONE 10 mg tablet          Patient Instructions     Patient Instructions   Start prednisone tomorrow. Benadryl as needed. Chief Complaint     Chief Complaint   Patient presents with   • Rash     Pt c/o a rash all over since Monday. History of Present Illness   Donna Casanova is a 70 y.o. male with hx progressive supranuclear palsy presenting to the clinic with wife for evaluation of worsening, spreading poison x one week. Pt was seen here one week ago and discharged with medrol dose pack. Wife thinks this improved the rash slightly however now redness and itching involved more on L arm, R arm, and diffusely on torso. Patient cannot stop scratching. Denies sob, sore throat, vomiting. Review of Systems   Review of Systems   Constitutional: Negative for chills and fever. HENT: Negative for sore throat and trouble swallowing. Respiratory: Negative for cough, choking, chest tightness and shortness of breath. Gastrointestinal: Negative for nausea and vomiting. Skin: Positive for rash (itching). Negative for wound.        Current Medications     Current Outpatient Medications:   •  predniSONE 10 mg tablet, Take 3 tabs BID X 2 days, 2 tabs BID X 2 days, 1 tab BID X 2 days, 1 tab daily X 2 days, Disp: 26 tablet, Rfl: 0  •  amitriptyline (ELAVIL) 25 mg tablet, take 1 tablet by mouth once daily at bedtime, Disp: 30 tablet, Rfl: 5  •  amLODIPine (NORVASC) 5 mg tablet, Take 1 tablet (5 mg total) by mouth daily, Disp: 90 tablet, Rfl: 3  •  Cholecalciferol (D3 ADULT PO), Take by mouth every other day (Patient not taking: Reported on 7/15/2023), Disp: , Rfl:   •  hydrochlorothiazide (HYDRODIURIL) 12.5 mg tablet, Take 1 tablet by mouth once daily (Patient taking differently: every other day), Disp: 90 tablet, Rfl: 3  •  Levothyroxine Sodium 88 MCG CAPS, Take by mouth daily, Disp: , Rfl:   •  methylPREDNISolone 4 MG tablet therapy pack, Use as directed on package, Disp: 1 each, Rfl: 0  •  vitamin B-12 (VITAMIN B-12) 1,000 mcg tablet, Take by mouth every other day (Patient not taking: Reported on 7/15/2023), Disp: , Rfl:   No current facility-administered medications for this visit. Current Allergies     Allergies as of 2023 - Reviewed 2023   Allergen Reaction Noted   • Other  2020   • Amantadine Itching and Rash 2021          The following portions of the patient's history were reviewed and updated as appropriate: allergies, current medications, past family history, past medical history, past social history, past surgical history and problem list.   Past Medical History:   Diagnosis Date   • History of echocardiogram 2018    Normal EF, Mild LVH   • Hypertension    • Hypothyroidism      No past surgical history on file.   Family History   Problem Relation Age of Onset   • Heart disease Mother          at age 59     Social History     Socioeconomic History   • Marital status: /Civil Union     Spouse name: Not on file   • Number of children: Not on file   • Years of education: Not on file   • Highest education level: Not on file   Occupational History   • Not on file   Tobacco Use   • Smoking status: Never   • Smokeless tobacco: Never   Vaping Use   • Vaping Use: Never used   Substance and Sexual Activity   • Alcohol use: Never   • Drug use: Never   • Sexual activity: Not on file   Other Topics Concern   • Not on file   Social History Narrative   • Not on file     Social Determinants of Health     Financial Resource Strain: Not on file   Food Insecurity: Not on file   Transportation Needs: Not on file Physical Activity: Not on file   Stress: Not on file   Social Connections: Not on file   Intimate Partner Violence: Not on file   Housing Stability: Not on file     Medications have been verified. Objective   /78   Pulse 65   Temp 97.6 °F (36.4 °C)   Resp 18   SpO2 96%      Physical Exam   Physical Exam  Vitals and nursing note reviewed. Constitutional:       General: He is not in acute distress. Appearance: He is normal weight. He is not ill-appearing or toxic-appearing. Comments: Seating in his wheelchair    HENT:      Head: Normocephalic. Right Ear: External ear normal.      Left Ear: External ear normal.   Eyes:      Conjunctiva/sclera: Conjunctivae normal.      Pupils: Pupils are equal (pinpoint). Cardiovascular:      Rate and Rhythm: Normal rate and regular rhythm. Pulmonary:      Effort: Pulmonary effort is normal. No respiratory distress. Breath sounds: Normal breath sounds. No stridor. No wheezing, rhonchi or rales. Musculoskeletal:      Cervical back: Normal range of motion. Skin:     General: Skin is warm. Findings: Rash present. Comments: Erythematous, dry, scaly rash involving BUE, back, b/l flanks and torso consistent with contact dermatitis   Neurological:      General: No focal deficit present. Mental Status: He is alert.

## 2023-11-27 ENCOUNTER — OFFICE VISIT (OUTPATIENT)
Dept: NEUROLOGY | Facility: CLINIC | Age: 78
End: 2023-11-27
Payer: MEDICARE

## 2023-11-27 VITALS — TEMPERATURE: 98.6 F | SYSTOLIC BLOOD PRESSURE: 136 MMHG | DIASTOLIC BLOOD PRESSURE: 71 MMHG

## 2023-11-27 DIAGNOSIS — G23.1 PROGRESSIVE SUPRANUCLEAR PALSY (HCC): Primary | ICD-10-CM

## 2023-11-27 PROCEDURE — 99214 OFFICE O/P EST MOD 30 MIN: CPT | Performed by: PSYCHIATRY & NEUROLOGY

## 2023-11-27 NOTE — PROGRESS NOTES
Patient ID: Tiera Casillas is a 70 y.o. male    Assessment/Plan:    Progressive supranuclear palsy ([address])  Progressive symptoms with increase difficulty with movements, dysphagia, dysarthria, and need for full assistance with normal activities of daily living and feeding. He has been sleeping better with amitriptyline. We will continue on this. Previously no response to levodopa and he develops side effects. We discussed potential benefits of home PT/OT to improve transfers and mobility. Given prior experiences, they are not interested at this time. Diagnoses and all orders for this visit:    Progressive supranuclear palsy ([address])        Subjective: Luca Bassett is a RH man with progressive supranuclear palsy who presents for movement follow up. To review, symptoms began in 2018 with falling. He was diagnosed by Dr. Linette Hernandez at [facility] in Feb 2019 ater being noted to have restrictions in vertical eye movements along with changes on MRI consistent with PSP. He has continued imbalance and shuffling of gait. Speech is soft. He has dysphagia. On thickened liquids and soft diet. He was hospitalized at [facility] in June for aspiration pneumonia. Home PT OT. Tremors are unchanged. He can be lightheaded on arising in the am.   He is assisted with all ADL's. Son-in-law assist with bathing. He no longer feed himself. He requires assistance for transfers and commonly falls backwards. He sleeps with commode at bedside. He sleeps in a hospital bed. He has difficulty turning in the bed and required assistance. Prior medications:  Sinemet - sedation  Sinemet CR - sedation and edema  Depakote for mood- ineffective  Amantadine- rash  Donepezil 10mg qhs - ineffective   Gabapentin for sleep and this was ineffective. Propranolol which did not help tremor.    Sublingual THC- did not help tremor  Zolpidem for sleep - ineffective    Current relevant meds:  Amitriptyline 25 mg nightly- greatly improved sleep. Objective:    /71 (BP Location: Right arm, Patient Position: Sitting, Cuff Size: Standard)   Temp 98.6 °F (37 °C)       Physical Exam  Vitals reviewed. Neurological:      Mental Status: He is alert. Motor: Motor strength is normal.        Neurological Exam  Mental Status  Alert. Oriented only to person and situation. Speech: Hypophonia, can be difficult to understand and needs to repeat. . Language is fluent with no aphasia. Attention and concentration are normal.  Questions appropriately. Speech fluent. .    Cranial Nerves  CN III, IV, VI:   Right pupil: Pinpoint. Left pupil: Pinpoint. Restricted vertical gaze. Slowed horizontal saccades. .  CN VII: Full and symmetric facial movement. CN VIII: Hearing is normal.  CN XI: Shoulder shrug strength is normal.  CN XII: Tongue midline without atrophy or fasciculations. Motor   Increased muscle tone. Strength is 5/5 throughout all four extremities. Coordination    See motor UPDRS    Moderate amplitude right hand rest tremor present much of the exam.  Milder on the left hand. Severe bradykinesia with small hand and leg movements. .    Gait    Not ambulated. .            MDS UPDRS III                              5/22/23 11/27/23   Time since last dose:      Speech  2 2   Facial Expression  3 3   Rigidity - Neck  4 4   Rigidity - Upper Extremity (Right)  2 2   Rigidity - Upper Extremity (Left)   2 2   Rigidity - Lower Extremity (Right)  2 2   Rigidity - Lower Extremity (Left)   2 2   Finger Taps (Right)   3 3   Finger Taps (Left)   3 3   Hand Movement (Right)  2 2   Hand Movement (Left)   2 2   Pronation/Supination (Right)  3 3   Pronation/Supination (Left)   3 3   Toe Tapping (Right) 3    Toe Tapping (Left) 3    Leg Agility (Right)  3 3   Leg Agility (Left)   3 3   Arising from Chair        Gait        Freezing of Gait      Postural Stability        Posture      Global spontaneity of movement 3 3   Postural Tremor (Right) 1 0 Postural Tremor (Left) 2 0   Kinetic Tremor (Right)  1 0   Kinetic Tremor (Left)  1 0   Rest tremor amplitude RUE 3 3   Rest tremor amplitude LUE 2 2   Rest tremor amplitude RLE 0 0   Reset tremor amplitude LLE 0 0   Lip/Jaw Tremor  0 0   Consistency of tremor 4 4   Motor Exam Total:              Shweta Burkett MD  Movement disorder physician  [id number] [facility]

## 2023-11-27 NOTE — ASSESSMENT & PLAN NOTE
Progressive symptoms with increase difficulty with movements, dysphagia, dysarthria, and need for full assistance with normal activities of daily living and feeding. He has been sleeping better with amitriptyline. We will continue on this. Previously no response to levodopa and he develops side effects. We discussed potential benefits of home PT/OT to improve transfers and mobility. Given prior experiences, they are not interested at this time.

## 2023-11-27 NOTE — PROGRESS NOTES
Review of Systems   Constitutional:  Negative for appetite change, fatigue and fever. HENT:  Positive for trouble swallowing (Trouble with eating) and voice change (Low volume as if a whisper). Negative for hearing loss and tinnitus. Eyes: Negative. Negative for photophobia, pain and visual disturbance. Respiratory: Negative. Negative for shortness of breath. Cardiovascular: Negative. Negative for palpitations. Gastrointestinal:  Positive for vomiting. Negative for nausea. Endocrine: Negative. Negative for cold intolerance. Genitourinary: Negative. Negative for dysuria, frequency and urgency. Musculoskeletal:  Positive for gait problem (Freezing and shuffles feet) and neck stiffness. Negative for back pain, myalgias and neck pain. Balance Issues has gotten worse     Skin: Negative. Negative for rash. Allergic/Immunologic: Negative. Neurological:  Positive for dizziness (In the morning when he sits up, not every day), tremors (Hands. has stayed about the same, Right hand worse than Left), speech difficulty (Difficulty understanding at times) and weakness (Legs). Negative for seizures, syncope, facial asymmetry, light-headedness, numbness and headaches. Difficulty with turning when in bed     Hematological: Negative. Does not bruise/bleed easily. Psychiatric/Behavioral: Negative. Negative for confusion, hallucinations and sleep disturbance. All other systems reviewed and are negative.

## 2024-03-10 DIAGNOSIS — G47.01 INSOMNIA DUE TO MEDICAL CONDITION: ICD-10-CM

## 2024-03-11 NOTE — TELEPHONE ENCOUNTER
Pt is requesting that more than #30 tabs of amitriptyline be dispensed. Last OV was 11/27/23, no future OV scheduled. Routed to provider to review the refill request.

## 2024-03-12 RX ORDER — AMITRIPTYLINE HYDROCHLORIDE 25 MG/1
25 TABLET, FILM COATED ORAL
Qty: 90 TABLET | Refills: 1 | Status: SHIPPED | OUTPATIENT
Start: 2024-03-12
